# Patient Record
Sex: FEMALE | Race: WHITE | NOT HISPANIC OR LATINO | Employment: UNEMPLOYED | URBAN - METROPOLITAN AREA
[De-identification: names, ages, dates, MRNs, and addresses within clinical notes are randomized per-mention and may not be internally consistent; named-entity substitution may affect disease eponyms.]

---

## 2017-10-09 ENCOUNTER — TELEPHONE (OUTPATIENT)
Dept: ORTHOPEDIC SURGERY | Facility: CLINIC | Age: 52
End: 2017-10-09

## 2017-11-03 ENCOUNTER — OFFICE VISIT (OUTPATIENT)
Dept: ORTHOPEDIC SURGERY | Facility: CLINIC | Age: 52
End: 2017-11-03

## 2017-11-03 VITALS — HEIGHT: 65 IN | TEMPERATURE: 97.3 F | WEIGHT: 166.8 LBS | BODY MASS INDEX: 27.79 KG/M2

## 2017-11-03 DIAGNOSIS — G89.29 CHRONIC PAIN OF RIGHT KNEE: Primary | ICD-10-CM

## 2017-11-03 DIAGNOSIS — M05.761 RHEUMATOID ARTHRITIS INVOLVING RIGHT KNEE WITH POSITIVE RHEUMATOID FACTOR (HCC): ICD-10-CM

## 2017-11-03 DIAGNOSIS — M25.561 CHRONIC PAIN OF RIGHT KNEE: Primary | ICD-10-CM

## 2017-11-03 DIAGNOSIS — M17.11 ARTHRITIS OF RIGHT KNEE: ICD-10-CM

## 2017-11-03 PROCEDURE — 73562 X-RAY EXAM OF KNEE 3: CPT | Performed by: ORTHOPAEDIC SURGERY

## 2017-11-03 PROCEDURE — 99204 OFFICE O/P NEW MOD 45 MIN: CPT | Performed by: ORTHOPAEDIC SURGERY

## 2017-11-03 RX ORDER — CEPHALEXIN 500 MG/1
500 CAPSULE ORAL DAILY
Refills: 0 | Status: ON HOLD | COMMUNITY
Start: 2017-08-22 | End: 2018-01-17

## 2017-11-03 RX ORDER — HYDROXYCHLOROQUINE SULFATE 200 MG/1
200 TABLET, FILM COATED ORAL 2 TIMES DAILY
Refills: 0 | COMMUNITY
Start: 2017-10-02 | End: 2018-01-19 | Stop reason: HOSPADM

## 2017-11-03 RX ORDER — UBIDECARENONE 75 MG
50 CAPSULE ORAL 2 TIMES DAILY
COMMUNITY
End: 2018-01-19 | Stop reason: HOSPADM

## 2017-11-03 RX ORDER — VITAMIN E 268 MG
400 CAPSULE ORAL DAILY
COMMUNITY
End: 2018-01-19 | Stop reason: HOSPADM

## 2017-11-03 RX ORDER — FERROUS SULFATE 325(65) MG
325 TABLET ORAL AS NEEDED
COMMUNITY
End: 2018-01-19 | Stop reason: HOSPADM

## 2017-11-03 RX ORDER — FLUOXETINE HYDROCHLORIDE 20 MG/1
60 CAPSULE ORAL EVERY MORNING
Refills: 0 | COMMUNITY
Start: 2017-10-02 | End: 2018-03-02

## 2017-11-03 RX ORDER — GABAPENTIN 400 MG/1
CAPSULE ORAL
Refills: 0 | COMMUNITY
Start: 2017-10-03 | End: 2017-12-21

## 2017-11-03 RX ORDER — CYCLOBENZAPRINE HCL 10 MG
TABLET ORAL
Refills: 0 | COMMUNITY
Start: 2017-10-03 | End: 2017-12-21

## 2017-11-03 RX ORDER — LANOLIN ALCOHOL/MO/W.PET/CERES
400 CREAM (GRAM) TOPICAL DAILY
COMMUNITY
End: 2018-01-19 | Stop reason: HOSPADM

## 2017-11-03 RX ORDER — LOSARTAN POTASSIUM 100 MG/1
100 TABLET ORAL EVERY MORNING
Refills: 0 | COMMUNITY
Start: 2017-10-02

## 2017-11-03 RX ORDER — MELOXICAM 7.5 MG/1
7.5 TABLET ORAL DAILY
COMMUNITY
End: 2017-12-21

## 2017-11-03 NOTE — PROGRESS NOTES
Patient Name: Makayla Boo   YOB: 1965  Referring Primary Care Physician: Madhuri Ruiz MD      Chief Complaint:    Chief Complaint   Patient presents with   • Right Knee - Establish Care        Subjective:    HPI:   Makayla Boo is a pleasant 51 y.o. year old who presents today for evaluation of   Chief Complaint   Patient presents with   • Right Knee - Establish Care   .  51 with chronic hx of right knee pain and stiffness.  Had scopes in the past, injections, meds- none of which work anymore.  Mother was my patient.  She has RA and is followed by [facility].  Pain is constant, sharp/ache and doesn't radiate.    This problem is new to this examiner.     Medications:   Home Medications:  No current outpatient prescriptions on file prior to visit.     No current facility-administered medications on file prior to visit.      Current Medications:  Scheduled Meds:  Continuous Infusions:  No current facility-administered medications for this visit.   PRN Meds:.    I have reviewed the patient's medical history in detail and updated the computerized patient record.  Review and summarization of old records includes:    Past Medical History:   Diagnosis Date   • Depression    • Hypertension       History reviewed. No pertinent surgical history.     Social History     Occupational History   • Not on file.     Social History Main Topics   • Smoking status: Current Every Day Smoker     Packs/day: 0.50     Years: 30.00     Types: Cigarettes   • Smokeless tobacco: Never Used   • Alcohol use Yes      Comment: social   • Drug use: No   • Sexual activity: Not on file    Social History     Social History Narrative   • No narrative on file      History reviewed. No pertinent family history.    ROS: 14 point review of systems was performed and all other systems were reviewed and are negative except for documented findings in HPI and today's encounter.     Allergies: No Known Allergies  Constitutional:  Denies fever, shaking or  chills   Eyes:  Denies change in visual acuity   HENT:  Denies nasal congestion or sore throat   Respiratory:  Denies cough or shortness of breath   Cardiovascular:  Denies chest pain or severe LE edema   GI:  Denies abdominal pain, nausea, vomiting, bloody stools or diarrhea   Musculoskeletal:  Numbness, tingling, pain, or loss of motor function only as noted above in history of present illness.  : Denies painful urination or hematuria  Integument:  Denies rash, lesion or ulceration   Neurologic:  Denies headache or focal weakness  Endocrine:  Denies lymphadenopathy  Psych:  Denies confusion or change in mental status   Hem:  Denies active bleeding    Objective:    Physical Exam: 51 y.o. female  Body mass index is 27.76 kg/(m^2)., @WT@, @HT@  Vitals:    11/03/17 1010   Temp: 97.3 °F (36.3 °C)     Vital signs reviewed.   General Appearance:    Alert, cooperative, in no acute distress                  Eyes: conjunctiva clear  ENT: external ears and nose atraumatic  CV: no peripheral edema  Resp: normal respiratory effort  Skin: no rashes or wounds; normal turgor  Psych: mood and affect appropriate  Lymph: no nodes appreciated  Neuro: gross sensation intact  Vascular:  Palpable peripheral pulse in noted extremity  Musculoskeletal Extremities: KNEE Exam: medial joint line tenderness with crepitation, synovitis, swelling, and joint effusion right knee. 20 degree flexion contracture.  Stiff in flexion as well.    Radiology:   Imaging done today and discussed at length with the patient:    Indication: pain related symptoms,  Views: 3V AP, LAT & 40 degree PA right knee(s)   Findings: severe end-stage arthritis (bone on bone, subchondral sclerosis/cysts, osteophytes), consistent with RA  Comparison views: not available      Assessment:     ICD-10-CM ICD-9-CM   1. Chronic pain of right knee M25.561 719.46    G89.29 338.29   2. Arthritis of right knee M17.11 716.96   3. Rheumatoid arthritis involving right knee with  "positive rheumatoid factor M05.761 714.0        Procedures       Plan: Biomechanics of pertinent body area discussed.  Risks, benefits, alternatives, comparisons, and complications of accepted medicines, injections, recommendations, surgical procedures, and therapies explained and education provided in laymen's terms. The patient was given the opportunity to ask questions and they were answerved to their satisfaction.   Natural history and expected course of this patient's diagnosis discussed along with evaluation of therapies. Questions answered.  Cryotherapy/brachy therapy as indicated with instructions.   PT referral.  Reviewed medical records from other provider(s) for past and current medical history pertinent to this complaint.  >3.5 min spent counseling on health benefits of smoking cessation and risks associated with continued tobacco use.  TKA: Obtain medical clearance for surgery. Continuation of conservative management vs. TKA: I reviewed anatomy of a total knee arthroplasty in laymen's terms, as well as typical postoperative recovery and possibly 6-12 months for maximal recovery, and possible need for rehabilitation stay after hospitalization. We also discussed risks, benefits, alternatives, and limitations of procedure with risks including but not limited to neurovascular damage, bleeding, infection, malalignment, chronic pian, failure of implants, osteolysis, loosening of implants, loss of motion, weakness, stiffness, instability, DVT, pulmonary embolus, death, stroke, complex regional pain syndrome, myocardial infarction, and need for additional procedures. Concept of substitution vs. replacement discussed.  No guarantees were given regarding results of surgery.  Patient verbalized understanding, and was given the opportunity to ask and have all questions answered today.   Stop smoking.  Went over increased risk with RA and smoking \"even a little\"  She said she would.  Told her she is stiff and could " deja post op stiffness.    11/3/2017

## 2017-12-21 ENCOUNTER — APPOINTMENT (OUTPATIENT)
Dept: PREADMISSION TESTING | Facility: HOSPITAL | Age: 52
End: 2017-12-21

## 2017-12-21 VITALS
BODY MASS INDEX: 26.84 KG/M2 | HEART RATE: 61 BPM | SYSTOLIC BLOOD PRESSURE: 161 MMHG | HEIGHT: 66 IN | RESPIRATION RATE: 20 BRPM | OXYGEN SATURATION: 97 % | WEIGHT: 167 LBS | DIASTOLIC BLOOD PRESSURE: 86 MMHG | TEMPERATURE: 97.1 F

## 2017-12-21 DIAGNOSIS — M17.11 ARTHRITIS OF RIGHT KNEE: ICD-10-CM

## 2017-12-21 LAB
ANION GAP SERPL CALCULATED.3IONS-SCNC: 11.8 MMOL/L
BILIRUB UR QL STRIP: NEGATIVE
BUN BLD-MCNC: 14 MG/DL (ref 6–20)
BUN/CREAT SERPL: 17.1 (ref 7–25)
CALCIUM SPEC-SCNC: 9.7 MG/DL (ref 8.6–10.5)
CHLORIDE SERPL-SCNC: 101 MMOL/L (ref 98–107)
CLARITY UR: CLEAR
CO2 SERPL-SCNC: 26.2 MMOL/L (ref 22–29)
COLOR UR: YELLOW
CREAT BLD-MCNC: 0.82 MG/DL (ref 0.57–1)
DEPRECATED RDW RBC AUTO: 42.9 FL (ref 37–54)
ERYTHROCYTE [DISTWIDTH] IN BLOOD BY AUTOMATED COUNT: 12.8 % (ref 11.7–13)
GFR SERPL CREATININE-BSD FRML MDRD: 74 ML/MIN/1.73
GLUCOSE BLD-MCNC: 108 MG/DL (ref 65–99)
GLUCOSE UR STRIP-MCNC: NEGATIVE MG/DL
HCT VFR BLD AUTO: 42.7 % (ref 35.6–45.5)
HGB BLD-MCNC: 14.2 G/DL (ref 11.9–15.5)
HGB UR QL STRIP.AUTO: NEGATIVE
KETONES UR QL STRIP: NEGATIVE
LEUKOCYTE ESTERASE UR QL STRIP.AUTO: NEGATIVE
MCH RBC QN AUTO: 30.6 PG (ref 26.9–32)
MCHC RBC AUTO-ENTMCNC: 33.3 G/DL (ref 32.4–36.3)
MCV RBC AUTO: 92 FL (ref 80.5–98.2)
NITRITE UR QL STRIP: NEGATIVE
PH UR STRIP.AUTO: 6 [PH] (ref 5–8)
PLATELET # BLD AUTO: 248 10*3/MM3 (ref 140–500)
PMV BLD AUTO: 10.6 FL (ref 6–12)
POTASSIUM BLD-SCNC: 4.4 MMOL/L (ref 3.5–5.2)
PROT UR QL STRIP: NEGATIVE
RBC # BLD AUTO: 4.64 10*6/MM3 (ref 3.9–5.2)
SODIUM BLD-SCNC: 139 MMOL/L (ref 136–145)
SP GR UR STRIP: 1.02 (ref 1–1.03)
UROBILINOGEN UR QL STRIP: NORMAL
WBC NRBC COR # BLD: 7.55 10*3/MM3 (ref 4.5–10.7)

## 2017-12-21 PROCEDURE — 93010 ELECTROCARDIOGRAM REPORT: CPT | Performed by: INTERNAL MEDICINE

## 2017-12-21 PROCEDURE — 80048 BASIC METABOLIC PNL TOTAL CA: CPT | Performed by: ORTHOPAEDIC SURGERY

## 2017-12-21 PROCEDURE — 93005 ELECTROCARDIOGRAM TRACING: CPT

## 2017-12-21 PROCEDURE — 81003 URINALYSIS AUTO W/O SCOPE: CPT | Performed by: ORTHOPAEDIC SURGERY

## 2017-12-21 PROCEDURE — 85027 COMPLETE CBC AUTOMATED: CPT | Performed by: ORTHOPAEDIC SURGERY

## 2017-12-21 PROCEDURE — 36415 COLL VENOUS BLD VENIPUNCTURE: CPT

## 2017-12-21 RX ORDER — GABAPENTIN 400 MG/1
400 CAPSULE ORAL 3 TIMES DAILY PRN
COMMUNITY

## 2017-12-21 RX ORDER — CHLORHEXIDINE GLUCONATE 500 MG/1
1 CLOTH TOPICAL TAKE AS DIRECTED
COMMUNITY
End: 2018-01-19 | Stop reason: HOSPADM

## 2017-12-21 RX ORDER — CYCLOBENZAPRINE HCL 10 MG
10 TABLET ORAL 3 TIMES DAILY PRN
COMMUNITY

## 2017-12-21 ASSESSMENT — KOOS JR
KOOS JR SCORE: 16
KOOS JR SCORE: 47.487

## 2017-12-21 NOTE — DISCHARGE INSTRUCTIONS
Take the following medications the morning of surgery with a small sip of water:    LOSARTAN AND FLUOXETINE    ARRIVE AT 9:00    General Instructions:  • Do not eat solid food after midnight the night before surgery.  • You may drink clear liquids day of surgery but must stop at least one hour before your hospital arrival time.  • It is beneficial for you to have a clear drink that contains carbohydrates the day of surgery.  We suggest a 12 to 20 ounce bottle of Gatorade or Powerade for non-diabetic patients or a 12 to 20 ounce bottle of G2 or Powerade Zero for diabetic patients. (Pediatric patients, are not advised to drink a 12 to 20 ounce carbohydrate drink)    Clear liquids are liquids you can see through.  Nothing red in color.     Plain water                               Sports drinks  Sodas                                   Gelatin (Jell-O)  Fruit juices without pulp such as white grape juice and apple juice  Popsicles that contain no fruit or yogurt  Tea or coffee (no cream or milk added)  Gatorade / Powerade  G2 / Powerade Zero    • Infants may have breast milk up to four hours before surgery.  • Infants drinking formula may drink formula up to six hours before surgery.   • Patients who avoid smoking, chewing tobacco and alcohol for 4 weeks prior to surgery have a reduced risk of post-operative complications.  Quit smoking as many days before surgery as you can.  • Do not smoke, use chewing tobacco or drink alcohol the day of surgery.   • If applicable bring your C-PAP/ BI-PAP machine.  • Bring any papers given to you in the doctor’s office.  • Wear clean comfortable clothes and socks.  • Do not wear contact lenses or make-up.  Bring a case for your glasses.   • Bring crutches or walker if applicable.  • Remove all piercings.  Leave jewelry and any other valuables at home.  • The Pre-Admission Testing nurse will instruct you to bring medications if unable to obtain an accurate list in Pre-Admission  Testing.        If you were given a blood bank ID arm band remember to bring it with you the day of surgery.    Preventing a Surgical Site Infection:  • For 2 to 3 days before surgery, avoid shaving with a razor because the razor can irritate skin and make it easier to develop an infection.  • The night prior to surgery sleep in a clean bed with clean clothing.  Do not allow pets to sleep with you.  • Shower on the morning of surgery using a fresh bar of anti-bacterial soap (such as Dial) and clean washcloth.  Dry with a clean towel and dress in clean clothing.  • Ask your surgeon if you will be receiving antibiotics prior to surgery.  • Make sure you, your family, and all healthcare providers clean their hands with soap and water or an alcohol based hand  before caring for you or your wound.    Day of surgery:  Upon arrival, a Pre-op nurse and Anesthesiologist will review your health history, obtain vital signs, and answer questions you may have.  The only belongings needed at this time will be your home medications and if applicable your C-PAP/BI-PAP machine.  If you are staying overnight your family can leave the rest of your belongings in the car and bring them to your room later.  A Pre-op nurse will start an IV and you may receive medication in preparation for surgery, including something to help you relax.  Your family will be able to see you in the Pre-op area.  While you are in surgery your family should notify the waiting room  if they leave the waiting room area and provide a contact phone number.    Please be aware that surgery does come with discomfort.  We want to make every effort to control your discomfort so please discuss any uncontrolled symptoms with your nurse.   Your doctor will most likely have prescribed pain medications.      If you are going home after surgery you will receive individualized written care instructions before being discharged.  A responsible adult must  drive you to and from the hospital on the day of your surgery and stay with you for 24 hours.    If you are staying overnight following surgery, you will be transported to your hospital room following the recovery period.  [facility] has all private rooms.    If you have any questions please call Pre-Admission Testing at [phone].  Deductibles and co-payments are collected on the day of service. Please be prepared to pay the required co-pay, deductible or deposit on the day of service as defined by your plan.    2% CHLORAHEXIDINE GLUCONATE* CLOTH  Preparing or “prepping” skin before surgery can reduce the risk of infection at the surgical site. To make the process easier, [facility] has chosen disposable cloths moistened with a rinse-free, 2% Chlorhexidine Gluconate (CHG) antiseptic solution. The steps below outline the prepping process and should be carefully followed.        Use the prep cloth on the area that is circled in the diagram             Directions Night before Surgery  1) Shower using a fresh bar of anti-bacterial soap (such as Dial) and clean washcloth.  Use a clean towel to completely dry your skin.  2) Do not use any lotions, oils or creams on your skin.  3) Open the package and remove 1 cloth, wipe your skin for 30 seconds in a circular motion.  Allow to dry for 3 minutes.  4) Repeat #3 with second cloth.  5) Do not touch your eyes, ears, or mouth with the prep cloth.  6) Allow the wet prep solution to air dry.  7) Discard the prep cloth and wash your hands with soap and water.   8) Dress in clean bed clothes and sleep on fresh clean bed sheets.   9) You may experience some temporary itching after the prep.    Directions Day of Surgery  1) Repeat steps 1,2,3,4,5,6,7, and 9.   2) Dress in clean clothes before coming to the hospital.    BACTROBAN NASAL OINTMENT  There are many germs normally in your nose. Bactroban is an ointment that will help reduce these germs. Please  follow these instructions for Bactroban use:    ____Two days before surgery in the evening Date________    ____The day before surgery in the morning  Date________    ____The day before surgery in the evening              Date________    ____The day of surgery in the morning    Date________    **Squirt ½ package of Bactroban Ointment onto a cotton applicator and apply to inside of 1st nostril.  Squirt the remaining Bactroban and apply to the inside of the other nostril.    PERIDEX- ORAL:  Use only if your surgeon has ordered  Use the night before and morning of surgery - Swish, gargle, and spit - do not swallow.

## 2018-01-03 NOTE — PROGRESS NOTES
Pre-Operative Orthopedic Assessment      Patient: Makayla Boo    YOB: 1965      Age/Gender: 52 y.o. female  Medical Record Number: [id number]  Surgical Procedure Planned: RIGHT TOTAL KNEE ARTHROPLASTY WITH HERBERT NAVIGATION     Surgeon: Arthur Higgins MD    Date of Surgery Planned: 01/17/2018    Question Value Score    Patient Score   1. What is your age group? 50-65 years  66-75 years  >75 years =2  =1  =0 2   2. Gender? Male  Female =2  =1 1   3. How far on average can you walk? (a block is 200 meters) Two blocks or more (+\- rest)  1-2 blocks (+\- rest)  Housebound (most of time) =2  =1  =0 1   4. Which gait aid to you use? (more often than not) None  Single-Point Stick  Crutches/Frame =2  =1  =0 2   5. Do you use community  supports? (home help, meals on wheels, district nursing) None or one per week  Two or more per week =1  =0 1   6. Will you live with someone who can care for you after your operation? Yes  No =3  =0 3      Your Score (out of 12)  10     Key Destination at Discharge from acute care predicted by score   Scores < 6  -- extended inpatient rehabilitation   Scores 6-9 -- additional intervention to discharge directly home (Rehabilitation in home)   Scores > 9 -- directly home         Discharge Disposition/Planning:     Patient Response   Discussed the Predicted discharge disposition needed based on RAPT Assessment with the patient.    yes   Patient selected discharge disposition:   home   Out Patient Rehabilitation Facility of Choice:    No recent therapy   Home Health Services Preferred:   [facility] and another agency in the past.  She will provide agency of choice once admitted.   Has the patient completed or been scheduled to complete pre-operative testing? yes   Post-Operative Care Giver Name and Phone Number:    Mother Annalisa [phone]     Subacute Inpatient Rehabilitation:  Complete this section only if planning inpatient services at a Subacute  Facility     Patient Response   Subacute Facility Preferred (Please list 2 facilities:      Requires pre-certification for inpatient rehabilitation services?         Planned source of transportation to inpatient rehabilitation facility?       If choosing inpatient services at an Acute or Subacute Facility please list a subsequent back-up plan (in case patient fails to qualify for inpatient rehabilitation). Back-up plans should include caregiver (family member or friend) for first 24-48 post- -operatively.       Home Equipment Patient Response   Does patient have a walker for home use?    Yes (mothers)   Does patient have a 3 in 1 commode for home use?    no   Does patient have a shower chair for home use?    no   Does patient have an elevated commode seat for home use? no   Does patient have a cane for home use?    Patient thinks her mother may have one   Is there any other medical equipment in the home? If so,  List in comment section below crutches   Pre-Operative Class Attendance Patient Response   Attended or scheduled to attend the pre-operative class within 1 year of total joint replacement? yes   Not planning to attend the pre-operative class (see comments below)    Attended in Dec   Patient Education  Completed   Expected time of discharge discussed yes   Encouraged to attend Pre-Operative Class    attended   Education re: Back-up plan for patients planning discharge to subacute facilities n/a   Education re: Predicted Discharge Disposition based on RAPT score    yes   Patient receptive and voiced understanding of information given    yes                                                                                                            Comments:    Address verified.  Patient has 3 steps to enter the home with handrails on both sides of steps.  No other steps needed once inside the home.  Patient resides with her sister but her mother will come to stay with her at dc and while her sister is at work.   Plan is home with .                                       Signature: Francisca Raman RN    Date:  1/3/2018

## 2018-01-11 ENCOUNTER — OFFICE VISIT (OUTPATIENT)
Dept: ORTHOPEDIC SURGERY | Facility: CLINIC | Age: 53
End: 2018-01-11

## 2018-01-11 VITALS — BODY MASS INDEX: 26.84 KG/M2 | HEIGHT: 66 IN | WEIGHT: 167 LBS | TEMPERATURE: 97.8 F

## 2018-01-11 DIAGNOSIS — M17.11 ARTHRITIS OF RIGHT KNEE: Primary | ICD-10-CM

## 2018-01-11 DIAGNOSIS — M05.761 RHEUMATOID ARTHRITIS INVOLVING RIGHT KNEE WITH POSITIVE RHEUMATOID FACTOR (HCC): ICD-10-CM

## 2018-01-11 PROCEDURE — S0260 H&P FOR SURGERY: HCPCS | Performed by: NURSE PRACTITIONER

## 2018-01-11 NOTE — PROGRESS NOTES
History & Physical       Patient: Makayla Boo  YOB: 1965  Medical Record Number: [id number]  Body mass index is 27.37 kg/(m^2).    Surgeon:  Dr. Arthur Higgins    Chief Complaints:   Chief Complaint   Patient presents with   • Right Knee - Pre-op Exam, Pain       Subjective:    History of Present Illness: 52 y.o. female presents with   Chief Complaint   Patient presents with   • Right Knee - Pre-op Exam, Pain   . Onset of symptoms was years ago and has been progressively worsening despite more conservative treatment measures.  Symptoms are associated with ability to move, exercise, and perform activities of daily living.  Symptoms are aggravated by weight bearing and ROM necessary for activities of daily living.   Symptoms improve with rest, ice and elevation only minimally.      Allergies:   Allergies   Allergen Reactions   • Codeine Nausea And Vomiting   • Adhesive Tape Rash       Medications:   Home Medications:  Current Outpatient Prescriptions on File Prior to Visit   Medication Sig   • cephalexin (KEFLEX) 500 MG capsule Take 500 mg by mouth Daily.   • Chlorhexidine Gluconate Cloth 2 % pads Apply  topically. As directed pre op   • cyclobenzaprine (FLEXERIL) 10 MG tablet Take 10 mg by mouth 3 (Three) Times a Day As Needed for Muscle Spasms.   • ferrous sulfate 325 (65 FE) MG tablet Take 325 mg by mouth As Needed.   • FLUoxetine (PROzac) 20 MG capsule Take 60 mg by mouth Every Morning.   • folic acid (FOLVITE) 400 MCG tablet Take 400 mcg by mouth Daily.   • gabapentin (NEURONTIN) 400 MG capsule Take 400 mg by mouth 3 (Three) Times a Day As Needed.   • hydroxychloroquine (PLAQUENIL) 200 MG tablet Take 200 mg by mouth 2 (Two) Times a Day.   • losartan (COZAAR) 100 MG tablet Take 100 mg by mouth Every Morning.   • mupirocin (BACTROBAN) 2 % nasal ointment into each nostril. As directed pre op   • vitamin B-12 (CYANOCOBALAMIN) 100 MCG tablet Take 50 mcg by mouth 2 (Two) Times a Day.   • vitamin E 400  UNIT capsule Take 400 Units by mouth Daily. TO HOLD 1 WEEK BEFORE SURGERY     No current facility-administered medications on file prior to visit.      Current Medications:  Scheduled Meds:  Continuous Infusions:  No current facility-administered medications for this visit.   PRN Meds:.    I have reviewed the patient's medical history in detail and updated the computerized patient record.  Review and summarization of old records include:    Past Medical History:   Diagnosis Date   • Anxiety    • Arthritis     RA   • COPD (chronic obstructive pulmonary disease)    • Depression    • Heart murmur    • Hepatitis C carrier    • History of sepsis     RT HAND/WIRST 2016   • History of transfusion    • Hypertension    • Knee pain         Past Surgical History:   Procedure Laterality Date   • BUNIONECTOMY Bilateral    • CARPAL TUNNEL RELEASE Bilateral    • CERVICAL FUSION     • INCISION AND DRAINAGE ABSCESS      RT HAND/WRIST X1   • KNEE ARTHROSCOPY     • KNEE ARTHROSCOPY W/ ACL RECONSTRUCTION Right    • ORIF FINGER / THUMB FRACTURE Right         Social History     Occupational History   • Not on file.     Social History Main Topics   • Smoking status: Current Every Day Smoker     Packs/day: 0.50     Years: 30.00     Types: Cigarettes   • Smokeless tobacco: Never Used   • Alcohol use Yes      Comment: social   • Drug use: Yes     Special: Marijuana      Comment: DAILY   • Sexual activity: Defer    Social History     Social History Narrative        Family History   Problem Relation Age of Onset   • Malig Hyperthermia Neg Hx        ROS: 14 point review of systems was performed and was negative except for documented findings in HPI and today's encounter.     Allergies:   Allergies   Allergen Reactions   • Codeine Nausea And Vomiting   • Adhesive Tape Rash     Constitutional:  Denies fever, shaking or chills   Eyes:  Denies change in visual acuity   HENT:  Denies nasal congestion or sore throat   Respiratory:  Denies cough or  shortness of breath   Cardiovascular:  Denies chest pain or severe LE edema   GI:  Denies abdominal pain, nausea, vomiting, bloody stools or diarrhea   Musculoskeletal:  Denies numbness tingling or loss of motor function except as outlined above in history of present illness.  : Denies painful urination or hematuria  Integument:  Denies rash, lesion or ulceration   Neurologic:  Denies headache or focal weakness  Endocrine:  Denies lymphadenopathy  Psych:  Denies confusion or change in mental status   Hem:  Denies active bleeding    Physical Exam: 52 y.o. female  Body mass index is 27.37 kg/(m^2)., @HT@, @WT@  Vitals:    01/11/18 0934   Temp: 97.8 °F (36.6 °C)     Vital signs reviewed.   General Appearance:    Alert, cooperative, in no acute distress                  Eyes: conjunctiva clear  ENT: external ears and nose atraumatic  CV: no peripheral edema  Resp: normal respiratory effort  Skin: no rashes or wounds; normal turgor  Psych: mood and affect appropriate  Lymph: no nodes appreciated  Neuro: gross sensation intact  Vascular:  Palpable peripheral pulse in noted extremity  Musculoskeletal Extremities: DETAILED KNEE Exam: Right knee: Painful gait w/wo limp, muscle atrophy, erythema, ecchymosis, or gross deformity noted, Large knee effusion, + lateral joint line tenderness, Active range of motion flexion contracture and approx 5 degrees, 5/5 strength flexion and extension, The knee is stable to varus and valgus stress testing, VARUS VALGUS NEUTRAL: valgus alignment of the limb, Lachman negative, Posterior drawer negative, Ryan's negative, Patellofemoral grind +, Sensation grossly intact to light tough throughout the lower extremity, Skin is intact, Distal pulses are palpable, No signs or symptoms of DVT          Diagnostic Tests:  No visits with results within 2 Week(s) from this visit.  Latest known visit with results is:    Appointment on 12/21/2017   Component Date Value Ref Range Status   • Glucose  12/21/2017 108* 65 - 99 mg/dL Final   • BUN 12/21/2017 14  6 - 20 mg/dL Final   • Creatinine 12/21/2017 0.82  0.57 - 1.00 mg/dL Final   • Sodium 12/21/2017 139  136 - 145 mmol/L Final   • Potassium 12/21/2017 4.4  3.5 - 5.2 mmol/L Final   • Chloride 12/21/2017 101  98 - 107 mmol/L Final   • CO2 12/21/2017 26.2  22.0 - 29.0 mmol/L Final   • Calcium 12/21/2017 9.7  8.6 - 10.5 mg/dL Final   • eGFR Non  Amer 12/21/2017 74  >60 mL/min/1.73 Final   • BUN/Creatinine Ratio 12/21/2017 17.1  7.0 - 25.0 Final   • Anion Gap 12/21/2017 11.8  mmol/L Final   • WBC 12/21/2017 7.55  4.50 - 10.70 10*3/mm3 Final   • RBC 12/21/2017 4.64  3.90 - 5.20 10*6/mm3 Final   • Hemoglobin 12/21/2017 14.2  11.9 - 15.5 g/dL Final   • Hematocrit 12/21/2017 42.7  35.6 - 45.5 % Final   • MCV 12/21/2017 92.0  80.5 - 98.2 fL Final   • MCH 12/21/2017 30.6  26.9 - 32.0 pg Final   • MCHC 12/21/2017 33.3  32.4 - 36.3 g/dL Final   • RDW 12/21/2017 12.8  11.7 - 13.0 % Final   • RDW-SD 12/21/2017 42.9  37.0 - 54.0 fl Final   • MPV 12/21/2017 10.6  6.0 - 12.0 fL Final   • Platelets 12/21/2017 248  140 - 500 10*3/mm3 Final   • Color, UA 12/21/2017 Yellow  Yellow, Straw Final   • Appearance, UA 12/21/2017 Clear  Clear Final   • pH, UA 12/21/2017 6.0  5.0 - 8.0 Final   • Specific Gravity, UA 12/21/2017 1.019  1.005 - 1.030 Final   • Glucose, UA 12/21/2017 Negative  Negative Final   • Ketones, UA 12/21/2017 Negative  Negative Final   • Bilirubin, UA 12/21/2017 Negative  Negative Final   • Blood, UA 12/21/2017 Negative  Negative Final   • Protein, UA 12/21/2017 Negative  Negative Final   • Leuk Esterase, UA 12/21/2017 Negative  Negative Final   • Nitrite, UA 12/21/2017 Negative  Negative Final   • Urobilinogen, UA 12/21/2017 0.2 E.U./dL  0.2 - 1.0 E.U./dL Final     No results found.    Imaging was done previously in the office, images were personally viewed and discussed at length with the patient:    Indication: pain related symptoms,  Views: 3V AP, LAT &  40 degree PA right knee(s)   Findings: severe end-stage arthritis (bone on bone, subchondral sclerosis/cysts, osteophytes)  Comparison views: viewed last xray done in the office.     Assessment:  Patient Active Problem List   Diagnosis   • Chronic pain of right knee   • Arthritis of right knee   • Rheumatoid arthritis involving right knee with positive rheumatoid factor       Plan:  Dr. Arthur Higgins reviewed anatomy of a total joint arthroplasty in laymen's terms, as well as typical postoperative recovery and possibly 6-12 months for maximal recovery, and possible need for rehabilitation stay after hospitalization. We also discussed risks, benefits, alternatives, and limitations of procedure with risks including but not limited to neurovascular damage, bleeding, infection, malalignment, chronic pian, failure of implants, osteolysis, loosening of implants, loss of motion, weakness, stiffness, instability, DVT, pulmonary embolus, death, stroke, complex regional pain syndrome, myocardial infarction, and need for additional procedures. Concept of substitution vs. replacement discussed.  No guarantees were given regarding results of surgery.      Makayla Boo was given the opportunity to ask and have all questions answered today.  The patient voiced understanding of the risks, benefits, and alternative forms of treatment that were discussed and the patient consents to proceed with surgery.     Patient's blood clot history is positive years ago was told that she had a blood clot in her calf but is not normally on blood thinner of any kind and isnt sure she really had one.   Planned DVT prophylaxis for surgery:  Oly James at [facility] infectious disease cleared for surgery after MRSA infection of right wrist that is now healed and completely clear.     Discharge Plan: POD 2-3 to home and home health    Patient was seen by ESTELA Vu in the office today.    Date: 1/11/2018  ESTELA Hernandez

## 2018-01-17 ENCOUNTER — ANESTHESIA EVENT (OUTPATIENT)
Dept: PERIOP | Facility: HOSPITAL | Age: 53
End: 2018-01-17

## 2018-01-17 ENCOUNTER — ANESTHESIA (OUTPATIENT)
Dept: PERIOP | Facility: HOSPITAL | Age: 53
End: 2018-01-17

## 2018-01-17 ENCOUNTER — HOSPITAL ENCOUNTER (INPATIENT)
Facility: HOSPITAL | Age: 53
LOS: 2 days | Discharge: HOME HEALTH | End: 2018-01-19
Attending: ORTHOPAEDIC SURGERY | Admitting: ORTHOPAEDIC SURGERY

## 2018-01-17 ENCOUNTER — APPOINTMENT (OUTPATIENT)
Dept: GENERAL RADIOLOGY | Facility: HOSPITAL | Age: 53
End: 2018-01-17

## 2018-01-17 DIAGNOSIS — M17.11 ARTHRITIS OF RIGHT KNEE: ICD-10-CM

## 2018-01-17 DIAGNOSIS — Z96.651 STATUS POST TOTAL RIGHT KNEE REPLACEMENT: Primary | ICD-10-CM

## 2018-01-17 PROCEDURE — 25010000002 PROPOFOL 10 MG/ML EMULSION: Performed by: ANESTHESIOLOGY

## 2018-01-17 PROCEDURE — 25010000002 MORPHINE (PF) 10 MG/ML SOLUTION 1 ML VIAL: Performed by: ORTHOPAEDIC SURGERY

## 2018-01-17 PROCEDURE — C1776 JOINT DEVICE (IMPLANTABLE): HCPCS | Performed by: ORTHOPAEDIC SURGERY

## 2018-01-17 PROCEDURE — 25010000002 HYDROMORPHONE PER 4 MG: Performed by: ANESTHESIOLOGY

## 2018-01-17 PROCEDURE — 0SRC0J9 REPLACEMENT OF RIGHT KNEE JOINT WITH SYNTHETIC SUBSTITUTE, CEMENTED, OPEN APPROACH: ICD-10-PCS | Performed by: ORTHOPAEDIC SURGERY

## 2018-01-17 PROCEDURE — C1713 ANCHOR/SCREW BN/BN,TIS/BN: HCPCS | Performed by: ORTHOPAEDIC SURGERY

## 2018-01-17 PROCEDURE — 25010000002 KETOROLAC TROMETHAMINE PER 15 MG: Performed by: ORTHOPAEDIC SURGERY

## 2018-01-17 PROCEDURE — 25010000002 HYDRALAZINE PER 20 MG: Performed by: ANESTHESIOLOGY

## 2018-01-17 PROCEDURE — 25010000002 ONDANSETRON PER 1 MG: Performed by: ANESTHESIOLOGY

## 2018-01-17 PROCEDURE — 8E0YXBZ COMPUTER ASSISTED PROCEDURE OF LOWER EXTREMITY: ICD-10-PCS | Performed by: ORTHOPAEDIC SURGERY

## 2018-01-17 PROCEDURE — 25010000003 CEFAZOLIN IN DEXTROSE 2-4 GM/100ML-% SOLUTION: Performed by: ANESTHESIOLOGY

## 2018-01-17 PROCEDURE — 25010000002 FENTANYL CITRATE (PF) 250 MCG/5ML SOLUTION

## 2018-01-17 PROCEDURE — 20985 CPTR-ASST DIR MS PX: CPT | Performed by: ORTHOPAEDIC SURGERY

## 2018-01-17 PROCEDURE — 25010000002 HYDROMORPHONE PER 4 MG: Performed by: ORTHOPAEDIC SURGERY

## 2018-01-17 PROCEDURE — 25010000002 EPINEPHRINE PER 0.1 MG: Performed by: ORTHOPAEDIC SURGERY

## 2018-01-17 PROCEDURE — 25010000002 MIDAZOLAM PER 1 MG: Performed by: ANESTHESIOLOGY

## 2018-01-17 PROCEDURE — 27447 TOTAL KNEE ARTHROPLASTY: CPT | Performed by: NURSE PRACTITIONER

## 2018-01-17 PROCEDURE — 25010000002 ROPIVACAINE PER 1 MG: Performed by: ORTHOPAEDIC SURGERY

## 2018-01-17 PROCEDURE — 27447 TOTAL KNEE ARTHROPLASTY: CPT | Performed by: ORTHOPAEDIC SURGERY

## 2018-01-17 PROCEDURE — 25010000002 NEOSTIGMINE PER 0.5 MG: Performed by: ANESTHESIOLOGY

## 2018-01-17 PROCEDURE — 25010000002 FENTANYL CITRATE (PF) 100 MCG/2ML SOLUTION: Performed by: ANESTHESIOLOGY

## 2018-01-17 PROCEDURE — 25010000002 DEXAMETHASONE PER 1 MG: Performed by: ANESTHESIOLOGY

## 2018-01-17 PROCEDURE — 73560 X-RAY EXAM OF KNEE 1 OR 2: CPT

## 2018-01-17 DEVICE — P.F.C. SIGMA TIBIAL TRAY FIXED BEARING MODULAR COCR 3 CEMENTED
Type: IMPLANTABLE DEVICE | Site: KNEE | Status: FUNCTIONAL
Brand: P.F.C. SIGMA

## 2018-01-17 DEVICE — IMPLANTABLE DEVICE: Type: IMPLANTABLE DEVICE | Site: KNEE | Status: FUNCTIONAL

## 2018-01-17 DEVICE — SMARTSET GMV HIGH PERFORMANCE GENTAMICIN MEDIUM VISCOSITY BONE CEMENT 40G
Type: IMPLANTABLE DEVICE | Site: KNEE | Status: FUNCTIONAL
Brand: SMARTSET

## 2018-01-17 DEVICE — SIGMA TIBIAL INSERT FIXED BEARING CURVED PLUS 3 8MM XLK
Type: IMPLANTABLE DEVICE | Site: KNEE | Status: FUNCTIONAL
Brand: SIGMA

## 2018-01-17 DEVICE — SIGMA FEMORAL CRUCIATE RETAINING CEMENTED 3 RIGHT
Type: IMPLANTABLE DEVICE | Site: KNEE | Status: FUNCTIONAL
Brand: SIGMA

## 2018-01-17 DEVICE — P.F.C. SIGMA OVAL DOME PATELLA 3-PEG 38MM CEMENTED
Type: IMPLANTABLE DEVICE | Site: KNEE | Status: FUNCTIONAL
Brand: P.F.C. SIGMA

## 2018-01-17 RX ORDER — HYDRALAZINE HYDROCHLORIDE 20 MG/ML
INJECTION INTRAMUSCULAR; INTRAVENOUS AS NEEDED
Status: DISCONTINUED | OUTPATIENT
Start: 2018-01-17 | End: 2018-01-17 | Stop reason: SURG

## 2018-01-17 RX ORDER — FAMOTIDINE 10 MG/ML
20 INJECTION, SOLUTION INTRAVENOUS ONCE
Status: COMPLETED | OUTPATIENT
Start: 2018-01-17 | End: 2018-01-17

## 2018-01-17 RX ORDER — PROMETHAZINE HYDROCHLORIDE 25 MG/1
25 SUPPOSITORY RECTAL ONCE AS NEEDED
Status: DISCONTINUED | OUTPATIENT
Start: 2018-01-17 | End: 2018-01-17 | Stop reason: HOSPADM

## 2018-01-17 RX ORDER — ONDANSETRON 2 MG/ML
INJECTION INTRAMUSCULAR; INTRAVENOUS AS NEEDED
Status: DISCONTINUED | OUTPATIENT
Start: 2018-01-17 | End: 2018-01-17 | Stop reason: SURG

## 2018-01-17 RX ORDER — PROPOFOL 10 MG/ML
VIAL (ML) INTRAVENOUS AS NEEDED
Status: DISCONTINUED | OUTPATIENT
Start: 2018-01-17 | End: 2018-01-17 | Stop reason: SURG

## 2018-01-17 RX ORDER — LABETALOL HYDROCHLORIDE 5 MG/ML
5 INJECTION, SOLUTION INTRAVENOUS
Status: DISCONTINUED | OUTPATIENT
Start: 2018-01-17 | End: 2018-01-17 | Stop reason: HOSPADM

## 2018-01-17 RX ORDER — ONDANSETRON 2 MG/ML
4 INJECTION INTRAMUSCULAR; INTRAVENOUS EVERY 6 HOURS PRN
Status: DISCONTINUED | OUTPATIENT
Start: 2018-01-17 | End: 2018-01-19 | Stop reason: HOSPADM

## 2018-01-17 RX ORDER — MIDAZOLAM HYDROCHLORIDE 1 MG/ML
2 INJECTION INTRAMUSCULAR; INTRAVENOUS
Status: DISCONTINUED | OUTPATIENT
Start: 2018-01-17 | End: 2018-01-17 | Stop reason: HOSPADM

## 2018-01-17 RX ORDER — FENTANYL CITRATE 50 UG/ML
INJECTION, SOLUTION INTRAMUSCULAR; INTRAVENOUS
Status: COMPLETED
Start: 2018-01-17 | End: 2018-01-17

## 2018-01-17 RX ORDER — FENTANYL CITRATE 50 UG/ML
50 INJECTION, SOLUTION INTRAMUSCULAR; INTRAVENOUS
Status: DISCONTINUED | OUTPATIENT
Start: 2018-01-17 | End: 2018-01-17 | Stop reason: HOSPADM

## 2018-01-17 RX ORDER — PROMETHAZINE HYDROCHLORIDE 25 MG/1
12.5 TABLET ORAL ONCE AS NEEDED
Status: DISCONTINUED | OUTPATIENT
Start: 2018-01-17 | End: 2018-01-17 | Stop reason: HOSPADM

## 2018-01-17 RX ORDER — DOCUSATE SODIUM 100 MG/1
100 CAPSULE, LIQUID FILLED ORAL 2 TIMES DAILY
Status: DISCONTINUED | OUTPATIENT
Start: 2018-01-17 | End: 2018-01-19 | Stop reason: HOSPADM

## 2018-01-17 RX ORDER — DIPHENHYDRAMINE HYDROCHLORIDE 50 MG/ML
12.5 INJECTION INTRAMUSCULAR; INTRAVENOUS
Status: DISCONTINUED | OUTPATIENT
Start: 2018-01-17 | End: 2018-01-17 | Stop reason: HOSPADM

## 2018-01-17 RX ORDER — EPHEDRINE SULFATE 50 MG/ML
INJECTION, SOLUTION INTRAVENOUS AS NEEDED
Status: DISCONTINUED | OUTPATIENT
Start: 2018-01-17 | End: 2018-01-17 | Stop reason: SURG

## 2018-01-17 RX ORDER — OXYCODONE AND ACETAMINOPHEN 7.5; 325 MG/1; MG/1
1 TABLET ORAL
Status: DISCONTINUED | OUTPATIENT
Start: 2018-01-17 | End: 2018-01-19 | Stop reason: HOSPADM

## 2018-01-17 RX ORDER — OXYCODONE AND ACETAMINOPHEN 7.5; 325 MG/1; MG/1
2 TABLET ORAL
Status: DISCONTINUED | OUTPATIENT
Start: 2018-01-17 | End: 2018-01-19 | Stop reason: HOSPADM

## 2018-01-17 RX ORDER — LIDOCAINE HYDROCHLORIDE 20 MG/ML
INJECTION, SOLUTION INFILTRATION; PERINEURAL AS NEEDED
Status: DISCONTINUED | OUTPATIENT
Start: 2018-01-17 | End: 2018-01-17 | Stop reason: SURG

## 2018-01-17 RX ORDER — HYDROCODONE BITARTRATE AND ACETAMINOPHEN 7.5; 325 MG/1; MG/1
1 TABLET ORAL ONCE AS NEEDED
Status: DISCONTINUED | OUTPATIENT
Start: 2018-01-17 | End: 2018-01-17 | Stop reason: HOSPADM

## 2018-01-17 RX ORDER — FLUOXETINE HYDROCHLORIDE 20 MG/1
60 CAPSULE ORAL EVERY MORNING
Status: DISCONTINUED | OUTPATIENT
Start: 2018-01-18 | End: 2018-01-19 | Stop reason: HOSPADM

## 2018-01-17 RX ORDER — GABAPENTIN 400 MG/1
400 CAPSULE ORAL 3 TIMES DAILY PRN
Status: DISCONTINUED | OUTPATIENT
Start: 2018-01-17 | End: 2018-01-19 | Stop reason: HOSPADM

## 2018-01-17 RX ORDER — MIDAZOLAM HYDROCHLORIDE 1 MG/ML
1 INJECTION INTRAMUSCULAR; INTRAVENOUS
Status: DISCONTINUED | OUTPATIENT
Start: 2018-01-17 | End: 2018-01-17 | Stop reason: HOSPADM

## 2018-01-17 RX ORDER — ACETAMINOPHEN 325 MG/1
325 TABLET ORAL EVERY 4 HOURS PRN
Status: DISCONTINUED | OUTPATIENT
Start: 2018-01-17 | End: 2018-01-19 | Stop reason: HOSPADM

## 2018-01-17 RX ORDER — NALOXONE HCL 0.4 MG/ML
0.1 VIAL (ML) INJECTION
Status: DISCONTINUED | OUTPATIENT
Start: 2018-01-17 | End: 2018-01-19 | Stop reason: HOSPADM

## 2018-01-17 RX ORDER — GLYCOPYRROLATE 0.2 MG/ML
INJECTION INTRAMUSCULAR; INTRAVENOUS AS NEEDED
Status: DISCONTINUED | OUTPATIENT
Start: 2018-01-17 | End: 2018-01-17 | Stop reason: SURG

## 2018-01-17 RX ORDER — PROMETHAZINE HYDROCHLORIDE 25 MG/ML
12.5 INJECTION, SOLUTION INTRAMUSCULAR; INTRAVENOUS ONCE AS NEEDED
Status: DISCONTINUED | OUTPATIENT
Start: 2018-01-17 | End: 2018-01-17 | Stop reason: HOSPADM

## 2018-01-17 RX ORDER — SODIUM CHLORIDE, SODIUM LACTATE, POTASSIUM CHLORIDE, CALCIUM CHLORIDE 600; 310; 30; 20 MG/100ML; MG/100ML; MG/100ML; MG/100ML
9 INJECTION, SOLUTION INTRAVENOUS CONTINUOUS
Status: DISCONTINUED | OUTPATIENT
Start: 2018-01-17 | End: 2018-01-19 | Stop reason: HOSPADM

## 2018-01-17 RX ORDER — SODIUM CHLORIDE, SODIUM LACTATE, POTASSIUM CHLORIDE, CALCIUM CHLORIDE 600; 310; 30; 20 MG/100ML; MG/100ML; MG/100ML; MG/100ML
100 INJECTION, SOLUTION INTRAVENOUS CONTINUOUS
Status: ACTIVE | OUTPATIENT
Start: 2018-01-17 | End: 2018-01-18

## 2018-01-17 RX ORDER — PROMETHAZINE HYDROCHLORIDE 25 MG/1
25 TABLET ORAL ONCE AS NEEDED
Status: DISCONTINUED | OUTPATIENT
Start: 2018-01-17 | End: 2018-01-17 | Stop reason: HOSPADM

## 2018-01-17 RX ORDER — LOSARTAN POTASSIUM 100 MG/1
100 TABLET ORAL EVERY MORNING
Status: DISCONTINUED | OUTPATIENT
Start: 2018-01-18 | End: 2018-01-19 | Stop reason: HOSPADM

## 2018-01-17 RX ORDER — FLUMAZENIL 0.1 MG/ML
0.2 INJECTION INTRAVENOUS AS NEEDED
Status: DISCONTINUED | OUTPATIENT
Start: 2018-01-17 | End: 2018-01-17 | Stop reason: HOSPADM

## 2018-01-17 RX ORDER — ONDANSETRON 2 MG/ML
4 INJECTION INTRAMUSCULAR; INTRAVENOUS ONCE AS NEEDED
Status: DISCONTINUED | OUTPATIENT
Start: 2018-01-17 | End: 2018-01-17 | Stop reason: HOSPADM

## 2018-01-17 RX ORDER — BISACODYL 5 MG/1
10 TABLET, DELAYED RELEASE ORAL DAILY PRN
Status: DISCONTINUED | OUTPATIENT
Start: 2018-01-17 | End: 2018-01-19 | Stop reason: HOSPADM

## 2018-01-17 RX ORDER — PROMETHAZINE HYDROCHLORIDE 12.5 MG/1
12.5 TABLET ORAL EVERY 6 HOURS PRN
Status: DISCONTINUED | OUTPATIENT
Start: 2018-01-17 | End: 2018-01-19 | Stop reason: HOSPADM

## 2018-01-17 RX ORDER — MAGNESIUM HYDROXIDE 1200 MG/15ML
LIQUID ORAL AS NEEDED
Status: DISCONTINUED | OUTPATIENT
Start: 2018-01-17 | End: 2018-01-17 | Stop reason: HOSPADM

## 2018-01-17 RX ORDER — EPHEDRINE SULFATE 50 MG/ML
5 INJECTION, SOLUTION INTRAVENOUS ONCE AS NEEDED
Status: DISCONTINUED | OUTPATIENT
Start: 2018-01-17 | End: 2018-01-17 | Stop reason: HOSPADM

## 2018-01-17 RX ORDER — HYDROMORPHONE HCL IN 0.9% NACL 10 MG/50ML
PATIENT CONTROLLED ANALGESIA SYRINGE INTRAVENOUS CONTINUOUS
Status: DISCONTINUED | OUTPATIENT
Start: 2018-01-17 | End: 2018-01-19 | Stop reason: HOSPADM

## 2018-01-17 RX ORDER — LIDOCAINE HYDROCHLORIDE 10 MG/ML
0.5 INJECTION, SOLUTION EPIDURAL; INFILTRATION; INTRACAUDAL; PERINEURAL ONCE AS NEEDED
Status: DISCONTINUED | OUTPATIENT
Start: 2018-01-17 | End: 2018-01-17 | Stop reason: HOSPADM

## 2018-01-17 RX ORDER — ROCURONIUM BROMIDE 10 MG/ML
INJECTION, SOLUTION INTRAVENOUS AS NEEDED
Status: DISCONTINUED | OUTPATIENT
Start: 2018-01-17 | End: 2018-01-17 | Stop reason: SURG

## 2018-01-17 RX ORDER — NALOXONE HCL 0.4 MG/ML
0.2 VIAL (ML) INJECTION AS NEEDED
Status: DISCONTINUED | OUTPATIENT
Start: 2018-01-17 | End: 2018-01-17 | Stop reason: HOSPADM

## 2018-01-17 RX ORDER — SODIUM CHLORIDE 0.9 % (FLUSH) 0.9 %
1-10 SYRINGE (ML) INJECTION AS NEEDED
Status: DISCONTINUED | OUTPATIENT
Start: 2018-01-17 | End: 2018-01-17 | Stop reason: HOSPADM

## 2018-01-17 RX ORDER — FENTANYL CITRATE 50 UG/ML
INJECTION, SOLUTION INTRAMUSCULAR; INTRAVENOUS AS NEEDED
Status: DISCONTINUED | OUTPATIENT
Start: 2018-01-17 | End: 2018-01-17 | Stop reason: SURG

## 2018-01-17 RX ORDER — BISACODYL 10 MG
10 SUPPOSITORY, RECTAL RECTAL DAILY PRN
Status: DISCONTINUED | OUTPATIENT
Start: 2018-01-17 | End: 2018-01-19 | Stop reason: HOSPADM

## 2018-01-17 RX ORDER — HYDROMORPHONE HCL 110MG/55ML
0.5 PATIENT CONTROLLED ANALGESIA SYRINGE INTRAVENOUS
Status: DISCONTINUED | OUTPATIENT
Start: 2018-01-17 | End: 2018-01-17 | Stop reason: HOSPADM

## 2018-01-17 RX ORDER — DEXAMETHASONE SODIUM PHOSPHATE 10 MG/ML
INJECTION INTRAMUSCULAR; INTRAVENOUS AS NEEDED
Status: DISCONTINUED | OUTPATIENT
Start: 2018-01-17 | End: 2018-01-17 | Stop reason: SURG

## 2018-01-17 RX ORDER — ONDANSETRON 4 MG/1
4 TABLET, FILM COATED ORAL EVERY 6 HOURS PRN
Status: DISCONTINUED | OUTPATIENT
Start: 2018-01-17 | End: 2018-01-19 | Stop reason: HOSPADM

## 2018-01-17 RX ORDER — DIPHENHYDRAMINE HCL 25 MG
25 CAPSULE ORAL EVERY 6 HOURS PRN
Status: DISCONTINUED | OUTPATIENT
Start: 2018-01-17 | End: 2018-01-19 | Stop reason: HOSPADM

## 2018-01-17 RX ORDER — NICOTINE 21 MG/24HR
1 PATCH, TRANSDERMAL 24 HOURS TRANSDERMAL EVERY 24 HOURS
Status: DISCONTINUED | OUTPATIENT
Start: 2018-01-17 | End: 2018-01-19 | Stop reason: HOSPADM

## 2018-01-17 RX ORDER — ONDANSETRON 4 MG/1
4 TABLET, ORALLY DISINTEGRATING ORAL EVERY 6 HOURS PRN
Status: DISCONTINUED | OUTPATIENT
Start: 2018-01-17 | End: 2018-01-19 | Stop reason: HOSPADM

## 2018-01-17 RX ORDER — CYCLOBENZAPRINE HCL 10 MG
10 TABLET ORAL 3 TIMES DAILY PRN
Status: DISCONTINUED | OUTPATIENT
Start: 2018-01-17 | End: 2018-01-19 | Stop reason: HOSPADM

## 2018-01-17 RX ORDER — CEFAZOLIN SODIUM 2 G/100ML
INJECTION, SOLUTION INTRAVENOUS AS NEEDED
Status: DISCONTINUED | OUTPATIENT
Start: 2018-01-17 | End: 2018-01-17 | Stop reason: SURG

## 2018-01-17 RX ORDER — HYDRALAZINE HYDROCHLORIDE 20 MG/ML
5 INJECTION INTRAMUSCULAR; INTRAVENOUS
Status: DISCONTINUED | OUTPATIENT
Start: 2018-01-17 | End: 2018-01-17 | Stop reason: HOSPADM

## 2018-01-17 RX ORDER — OXYCODONE AND ACETAMINOPHEN 7.5; 325 MG/1; MG/1
1 TABLET ORAL ONCE AS NEEDED
Status: COMPLETED | OUTPATIENT
Start: 2018-01-17 | End: 2018-01-17

## 2018-01-17 RX ORDER — DIPHENHYDRAMINE HYDROCHLORIDE 50 MG/ML
25 INJECTION INTRAMUSCULAR; INTRAVENOUS EVERY 6 HOURS PRN
Status: DISCONTINUED | OUTPATIENT
Start: 2018-01-17 | End: 2018-01-19 | Stop reason: HOSPADM

## 2018-01-17 RX ADMIN — Medication 2 MG: at 09:42

## 2018-01-17 RX ADMIN — SODIUM CHLORIDE, POTASSIUM CHLORIDE, SODIUM LACTATE AND CALCIUM CHLORIDE 9 ML/HR: 600; 310; 30; 20 INJECTION, SOLUTION INTRAVENOUS at 09:09

## 2018-01-17 RX ADMIN — HYDRALAZINE HYDROCHLORIDE 10 MG: 20 INJECTION INTRAMUSCULAR; INTRAVENOUS at 11:58

## 2018-01-17 RX ADMIN — GLYCOPYRROLATE 0.4 MG: 0.2 INJECTION INTRAMUSCULAR; INTRAVENOUS at 12:34

## 2018-01-17 RX ADMIN — SODIUM CHLORIDE, POTASSIUM CHLORIDE, SODIUM LACTATE AND CALCIUM CHLORIDE 100 ML/HR: 600; 310; 30; 20 INJECTION, SOLUTION INTRAVENOUS at 17:15

## 2018-01-17 RX ADMIN — HYDROMORPHONE HYDROCHLORIDE: 10 INJECTION INTRAMUSCULAR; INTRAVENOUS; SUBCUTANEOUS at 15:32

## 2018-01-17 RX ADMIN — FENTANYL CITRATE 50 MCG: 50 INJECTION INTRAMUSCULAR; INTRAVENOUS at 11:45

## 2018-01-17 RX ADMIN — SODIUM CHLORIDE, POTASSIUM CHLORIDE, SODIUM LACTATE AND CALCIUM CHLORIDE: 600; 310; 30; 20 INJECTION, SOLUTION INTRAVENOUS at 11:25

## 2018-01-17 RX ADMIN — OXYCODONE HYDROCHLORIDE AND ACETAMINOPHEN 1 TABLET: 7.5; 325 TABLET ORAL at 13:53

## 2018-01-17 RX ADMIN — CEFAZOLIN SODIUM 2 G: 10 INJECTION, POWDER, FOR SOLUTION INTRAVENOUS at 17:12

## 2018-01-17 RX ADMIN — DOCUSATE SODIUM 100 MG: 100 CAPSULE, LIQUID FILLED ORAL at 21:16

## 2018-01-17 RX ADMIN — EPHEDRINE SULFATE 10 MG: 50 INJECTION INTRAMUSCULAR; INTRAVENOUS; SUBCUTANEOUS at 12:37

## 2018-01-17 RX ADMIN — HYDRALAZINE HYDROCHLORIDE 10 MG: 20 INJECTION INTRAMUSCULAR; INTRAVENOUS at 11:53

## 2018-01-17 RX ADMIN — HYDROMORPHONE HYDROCHLORIDE 0.5 MG: 2 INJECTION, SOLUTION INTRAMUSCULAR; INTRAVENOUS; SUBCUTANEOUS at 13:03

## 2018-01-17 RX ADMIN — ROCURONIUM BROMIDE 10 MG: 10 INJECTION INTRAVENOUS at 11:45

## 2018-01-17 RX ADMIN — FENTANYL CITRATE 50 MCG: 50 INJECTION, SOLUTION INTRAMUSCULAR; INTRAVENOUS at 14:42

## 2018-01-17 RX ADMIN — RIVAROXABAN 10 MG: 10 TABLET, FILM COATED ORAL at 21:16

## 2018-01-17 RX ADMIN — HYDROMORPHONE HYDROCHLORIDE 0.5 MG: 2 INJECTION, SOLUTION INTRAMUSCULAR; INTRAVENOUS; SUBCUTANEOUS at 13:08

## 2018-01-17 RX ADMIN — FENTANYL CITRATE 50 MCG: 50 INJECTION INTRAMUSCULAR; INTRAVENOUS at 11:49

## 2018-01-17 RX ADMIN — ONDANSETRON HYDROCHLORIDE 4 MG: 4 TABLET, FILM COATED ORAL at 18:28

## 2018-01-17 RX ADMIN — Medication 2 MG: at 09:09

## 2018-01-17 RX ADMIN — HYDROMORPHONE HYDROCHLORIDE 0.5 MG: 2 INJECTION, SOLUTION INTRAMUSCULAR; INTRAVENOUS; SUBCUTANEOUS at 13:45

## 2018-01-17 RX ADMIN — LIDOCAINE HYDROCHLORIDE 100 MG: 20 INJECTION, SOLUTION INFILTRATION; PERINEURAL at 11:25

## 2018-01-17 RX ADMIN — ROCURONIUM BROMIDE 40 MG: 10 INJECTION INTRAVENOUS at 11:25

## 2018-01-17 RX ADMIN — FENTANYL CITRATE 50 MCG: 50 INJECTION INTRAMUSCULAR; INTRAVENOUS at 11:59

## 2018-01-17 RX ADMIN — HYDROMORPHONE HYDROCHLORIDE 0.5 MG: 2 INJECTION, SOLUTION INTRAMUSCULAR; INTRAVENOUS; SUBCUTANEOUS at 13:38

## 2018-01-17 RX ADMIN — FAMOTIDINE 20 MG: 10 INJECTION, SOLUTION INTRAVENOUS at 09:09

## 2018-01-17 RX ADMIN — FENTANYL CITRATE 50 MCG: 50 INJECTION INTRAMUSCULAR; INTRAVENOUS at 12:28

## 2018-01-17 RX ADMIN — FENTANYL CITRATE 50 MCG: 50 INJECTION, SOLUTION INTRAMUSCULAR; INTRAVENOUS at 13:20

## 2018-01-17 RX ADMIN — ONDANSETRON 4 MG: 2 INJECTION INTRAMUSCULAR; INTRAVENOUS at 12:33

## 2018-01-17 RX ADMIN — CEFAZOLIN SODIUM 2 G: 2 INJECTION, SOLUTION INTRAVENOUS at 11:30

## 2018-01-17 RX ADMIN — NEOSTIGMINE METHYLSULFATE 3 MG: 1 INJECTION INTRAMUSCULAR; INTRAVENOUS; SUBCUTANEOUS at 12:34

## 2018-01-17 RX ADMIN — MUPIROCIN: 20 OINTMENT TOPICAL at 21:16

## 2018-01-17 RX ADMIN — PROPOFOL 200 MG: 10 INJECTION, EMULSION INTRAVENOUS at 11:25

## 2018-01-17 RX ADMIN — DEXAMETHASONE SODIUM PHOSPHATE 4 MG: 10 INJECTION INTRAMUSCULAR; INTRAVENOUS at 11:37

## 2018-01-17 RX ADMIN — SODIUM CHLORIDE, POTASSIUM CHLORIDE, SODIUM LACTATE AND CALCIUM CHLORIDE 100 ML/HR: 600; 310; 30; 20 INJECTION, SOLUTION INTRAVENOUS at 20:27

## 2018-01-17 RX ADMIN — NICOTINE 1 PATCH: 21 PATCH, EXTENDED RELEASE TRANSDERMAL at 21:16

## 2018-01-17 NOTE — ANESTHESIA PROCEDURE NOTES
Airway  Urgency: elective    Airway not difficult    General Information and Staff    Patient location during procedure: OR  Anesthesiologist: DENIA GONZALEZ    Indications and Patient Condition  Indications for airway management: airway protection    Preoxygenated: yes  MILS maintained throughout  Mask difficulty assessment: 2 - vent by mask + OA or adjuvant +/- NMBA    Final Airway Details  Final airway type: endotracheal airway      Successful airway: ETT  Cuffed: yes   Successful intubation technique: direct laryngoscopy  Facilitating devices/methods: cricoid pressure  Endotracheal tube insertion site: oral  Blade: Lisa  Blade size: #3  ETT size: 7.0 mm  Cormack-Lehane Classification: grade IIa - partial view of glottis  Placement verified by: capnometry   Cuff volume (mL): 5  Measured from: lips  ETT to lips (cm): 20  Number of attempts at approach: 1

## 2018-01-17 NOTE — PLAN OF CARE
Problem: Patient Care Overview (Adult)  Goal: Plan of Care Review  Outcome: Ongoing (interventions implemented as appropriate)   01/17/18 6384   Coping/Psychosocial Response Interventions   Plan Of Care Reviewed With patient   Patient Care Overview   Progress improving   Outcome Evaluation   Outcome Summary/Follow up Plan Arrived to floor at 1600 VSS voided x 2. PCA available for pain Med x 1 for n/v Instructed on hypertension and need for freq BP checks       Problem: Perioperative Period (Adult)  Goal: Signs and Symptoms of Listed Potential Problems Will be Absent or Manageable (Perioperative Period)  Outcome: Ongoing (interventions implemented as appropriate)      Problem: Knee Replacement, Total (Adult)  Goal: Signs and Symptoms of Listed Potential Problems Will be Absent or Manageable (Knee Replacement, Total)  Outcome: Ongoing (interventions implemented as appropriate)      Problem: Fall Risk (Adult)  Goal: Identify Related Risk Factors and Signs and Symptoms  Outcome: Outcome(s) achieved Date Met: 01/17/18    Goal: Absence of Falls  Outcome: Ongoing (interventions implemented as appropriate)

## 2018-01-17 NOTE — ANESTHESIA PREPROCEDURE EVALUATION
Anesthesia Evaluation     Patient summary reviewed and Nursing notes reviewed   NPO Solid Status: > 8 hours  NPO Liquid Status: > 2 hours     Airway   Mallampati: I  TM distance: >3 FB  Neck ROM: full  no difficulty expected  Dental - normal exam   (+) edentulous    Pulmonary - normal exam   (+) a smoker Current Smoked day of surgery, COPD mild,   Cardiovascular - normal exam    ECG reviewed    (+) hypertension (one med) poorly controlled,       Neuro/Psych  GI/Hepatic/Renal/Endo      Musculoskeletal     Abdominal  - normal exam    Bowel sounds: normal.   Substance History   (+) alcohol use, drug use (marijuana daily)     OB/GYN          Other   (+) arthritis                                             Anesthesia Plan    ASA 2     Anesthetic plan and risks discussed with patient.

## 2018-01-17 NOTE — PROGRESS NOTES
Discharge Planning Assessment  [facility]     Patient Name: Makayla Boo  MRN: [id number]  Today's Date: 1/17/2018    Admit Date: 1/17/2018          Discharge Needs Assessment       01/17/18 1742    Living Environment    Lives With sibling(s);child(tao), dependent    Living Arrangements house    Home Accessibility stairs to enter home    Number of Stairs to Enter Home 3    Stair Railings at Home outside, present at both sides    Type of Financial/Environmental Concern none    Transportation Available car;family or friend will provide    Living Environment    Provides Primary Care For child(tao)    Quality Of Family Relationships supportive;helpful;involved    Able to Return to Prior Living Arrangements yes    Discharge Needs Assessment    Concerns To Be Addressed discharge planning concerns    Equipment Currently Used at Home none;walker, rolling    Equipment Needed After Discharge commode;raised toilet    Discharge Facility/Level Of Care Needs home with home health    Discharge Disposition home healthcare service    Discharge Planning Comments [facility]            Discharge Plan       01/17/18 1744    Case Management/Social Work Plan    Plan [facility]    Patient/Family In Agreement With Plan yes    Additional Comments IMM letter checked. CCP reviewed pre-operative ortho assessment completed on 1/3/18 (Francisca Raman RN). CCP met with pt to confirm d/c plan. Pt confirms plan to d/c home with home health and selects [facility] for referral. CCP placed referral in [facility] but could not leave [facility] as it was unavailable. Pt states she has a walker at home, but requests [facility] for d/c. CCP requested [facility] order in treatment team sticky note. CCP to follow to assist with additional needs should they arise. Leslie Boyd LCSW        Discharge Placement     Facility/Agency Request Status Selected? Address Phone Number Fax Number    [facility] Accepted    Yes [address] [place] [id number] [phone]         Payton Boyd LCSW 1/17/2018 17:24    Referral to [facility]                   Expected Discharge Date and Time     Expected Discharge Date Expected Discharge Time    Jan 18, 2018               Demographic Summary       01/17/18 1742    Referral Information    Admission Type inpatient    Arrived From admitted as an inpatient    Referral Source nursing;physician    Reason For Consult discharge planning    Record Reviewed medical record    Primary Care Physician Information    Name Madisyn James ESTELA            Functional Status       01/17/18 1742    Functional Status Current    Ambulation 3-->assistive equipment and person    Transferring 3-->assistive equipment and person    Toileting 3-->assistive equipment and person    Bathing 3-->assistive equipment and person    Dressing 0-->independent    Eating 0-->independent    Communication 0-->understands/communicates without difficulty    Swallowing (if score 2 or more for any item, consult Rehab Services) 0-->swallows foods/liquids without difficulty    Functional Status Prior    Ambulation 0-->independent    Transferring 0-->independent    Toileting 0-->independent    Bathing 0-->independent    Dressing 0-->independent    Eating 0-->independent    Communication 0-->understands/communicates without difficulty    Swallowing 0-->swallows foods/liquids without difficulty    Cognitive/Perceptual/Developmental    Current Mental Status/Cognitive Functioning no deficits noted            Psychosocial     None            Abuse/Neglect     None            Legal     None            Substance Abuse     None            Patient Forms     None          Payton Boyd LCSW

## 2018-01-17 NOTE — OP NOTE
Operative Note    Name: Makayla Boo  YOB: 1965  MRN: [id number]  BMI: Body mass index is 27.42 kg/(m^2).    DATE OF SURGERY: 1/17/2018    PREOPERATIVE DIAGNOSIS: right knee end-stage osteoarthritis    POSTOPERATIVE DIAGNOSIS: right knee end-stage osteoarthritis    PROCEDURE PERFORMED: right total knee replacement with [place] navigation    SURGEON: Dr. Arthur Higgins    ASSISTANT: ESTELA Vu    IMPLANTS: Depuy PFC size 3R femoral component, size 3 tibial baseplate, 8mm polyethylene insert, 38 mm patellar button    Estimated Blood Loss: 100cc  Specimens : none  Complications: none  22 Modifier:  stiffness and scarring and previously operated joint    DESCRIPTION OF PROCEDURE: The patient was taken to the operating room and placed in the supine position. Preoperative antibiotics were administered. Surgical time out was performed. After adequate induction of anesthesia, the leg was prepped and draped in the usual sterile fashion.  Surgical time out was performed. The leg was exsanguinated with an Esmarch bandage and the tourniquet inflated to 250 mmHg. A midline incision was performed followed by a medial parapatellar arthrotomy. The patella was subluxed laterally.  A portion of the fat pad, ACL, and anterior horns of the meniscus were excised.  The MPV navigation device was affixed and navigated. The distal cut was made. The femur was then sized with a sizing guide. The femoral cutting block was placed and all femoral cuts were performed. The proximal tibia was exposed. [place] navigation protocol was accomplished.  11 millimeters were removed.  We used the extramedullary tibial cutting guide set for removal of 3mm of bone off the low side. The tibial cut was performed. The posterior horns of the menisci were excised. The posterior osteophytes were removed. Flexion extension blocks were then used to balance the knee. The tibial cut surface was then sized with the sizing templates and the  tibial and femoral trial were then placed. The tray was aligned with the middle third of the tubercle.    Attention was then placed to the patella. The patella was balanced to track centrally through range of motion.  It measured 24 and patella resurfacing was performed.   At this point all trial components were removed, the knee was copiously irrigated with pulsed lavage, and the knee was injected with anesthetic cocktail solution. The cut surfaces were then dried with clean lap sponges, and the components were cemented tibia, followed by femura. The knee was held in full extension and all excess cement was removed. The knee was held still until the cement had completely hardened. We then placed the trial polyethylene spacer which resulted in full extension and excellent flexion-extension balance. We placed the final polyethylene spacer.   The knee was then copiously irrigated with the full 3 liters. The tourniquet was then released. There was excellent hemostasis. We closed the knee in multiple layers in standard fashion. Sterile dressing were applied. At the end of the case, the sponge and needle counts were reported as being correct. There were no known complications. The patient was then transported to the recovery room.    Surgical assistant performed retraction, suction, hemostasis, and specific limb positioning and manipulation required for accuracy of joint placement, and assistance in wound closure and dressing application.       Arthur Higgins M.D.

## 2018-01-17 NOTE — DISCHARGE PLACEMENT REQUEST
"Parth Jimenez (52 y.o. Female)     Date of Birth Social Security Number Address Home Phone MRN    1965  [address] [place], [place] [id number] [phone] [id number]    [employer] Marital Status          None Single       Admission Date Admission Type Admitting Provider Attending Provider Department, Room/Bed    1/17/18 Elective Arthur Higgins MD Makk, Stephen P, MD [address], P783/1    Discharge Date Discharge Disposition Discharge Destination                      Attending Provider: Arthur Higgins MD     Allergies:  Codeine, Adhesive Tape    Isolation:  None   Infection:  None   Code Status:  FULL    Ht:  166.4 cm (65.5\")   Wt:  75.9 kg (167 lb 4.8 oz)    Admission Cmt:  None   Principal Problem:  Arthritis of right knee [M17.11]                 Active Insurance as of 1/17/2018     Primary Coverage     Payor Plan Insurance Group Employer/Plan Group    [facility] MEDICARE REPLACEMENT [facility]      Payor Plan Address Payor Plan Phone Number Effective From Effective To    [address] [address] [phone] 10/11/2017     [place] [address]       Subscriber Name Subscriber Birth Date Member ID       PARTH JIMENEZ 1965 [id number]           Secondary Coverage     Payor Plan Insurance Group Employer/Plan Group    [employer]     Payor Plan Address Payor Plan Phone Number Effective From Effective To    [address] [phone] 10/1/2016     [place] [address]       Subscriber Name Subscriber Birth Date Member ID       PARTH JIMENEZ 1965 [id number]                 Emergency Contacts      (Rel.) Home Phone Work Phone Mobile Phone    Annalisa Pedro (Mother) [phone] -- --              "

## 2018-01-17 NOTE — ANESTHESIA POSTPROCEDURE EVALUATION
Patient: Makayla Boo    Procedure Summary     Date Anesthesia Start Anesthesia Stop Room / Location    01/17/18 1123 1304  [facility] OR [facility] /  [facility]       Procedure Diagnosis Surgeon Provider    RIGHT TOTAL KNEE ARTHROPLASTY WITH [place] NAVIGATION (Right Knee) Arthritis of right knee  (Arthritis of right knee [M17.11]) MD Stu Lemus MD          Anesthesia Type: No value filed.  Last vitals  BP   106/65 (01/17/18 1445)   Temp   36.7 °C (98 °F) (01/17/18 1305)   Pulse   63 (01/17/18 1445)   Resp   14 (01/17/18 1445)     SpO2   97 % (01/17/18 1445)     Post Anesthesia Care and Evaluation    Patient location during evaluation: PACU  Patient participation: complete - patient participated  Level of consciousness: awake and alert  Pain management: adequate  Airway patency: patent  Anesthetic complications: No anesthetic complications    Cardiovascular status: acceptable  Respiratory status: acceptable  Hydration status: acceptable    Comments: --------------------            01/17/18               1445     --------------------   BP:       106/65     Pulse:      63       Resp:       14       Temp:                SpO2:      97%      --------------------

## 2018-01-17 NOTE — PLAN OF CARE
Problem: Patient Care Overview (Adult)  Goal: Plan of Care Review  Outcome: Ongoing (interventions implemented as appropriate)   01/17/18 0846   Coping/Psychosocial Response Interventions   Plan Of Care Reviewed With patient   Patient Care Overview   Progress no change     Goal: Adult Individualization and Mutuality  Outcome: Ongoing (interventions implemented as appropriate)   01/17/18 0846   Individualization   Patient Specific Preferences none     Goal: Discharge Needs Assessment  Outcome: Ongoing (interventions implemented as appropriate)   01/17/18 0846   Discharge Needs Assessment   Concerns To Be Addressed no discharge needs identified;denies needs/concerns at this time   Equipment Needed After Discharge walker, rolling;crutches   Self-Care   Equipment Currently Used at Home none       Problem: Perioperative Period (Adult)  Goal: Signs and Symptoms of Listed Potential Problems Will be Absent or Manageable (Perioperative Period)  Outcome: Ongoing (interventions implemented as appropriate)   01/17/18 0846   Perioperative Period   Problems Assessed (Perioperative Period) pain;infection;physiologic stress response   Problems Present (Perioperative Period) pain;physiologic stress response

## 2018-01-17 NOTE — H&P (VIEW-ONLY)
History & Physical       Patient: Makayla Boo  YOB: 1965  Medical Record Number: [id number]  Body mass index is 27.37 kg/(m^2).    Surgeon:  Dr. Arthur Higgins    Chief Complaints:   Chief Complaint   Patient presents with   • Right Knee - Pre-op Exam, Pain       Subjective:    History of Present Illness: 52 y.o. female presents with   Chief Complaint   Patient presents with   • Right Knee - Pre-op Exam, Pain   . Onset of symptoms was years ago and has been progressively worsening despite more conservative treatment measures.  Symptoms are associated with ability to move, exercise, and perform activities of daily living.  Symptoms are aggravated by weight bearing and ROM necessary for activities of daily living.   Symptoms improve with rest, ice and elevation only minimally.      Allergies:   Allergies   Allergen Reactions   • Codeine Nausea And Vomiting   • Adhesive Tape Rash       Medications:   Home Medications:  Current Outpatient Prescriptions on File Prior to Visit   Medication Sig   • cephalexin (KEFLEX) 500 MG capsule Take 500 mg by mouth Daily.   • Chlorhexidine Gluconate Cloth 2 % pads Apply  topically. As directed pre op   • cyclobenzaprine (FLEXERIL) 10 MG tablet Take 10 mg by mouth 3 (Three) Times a Day As Needed for Muscle Spasms.   • ferrous sulfate 325 (65 FE) MG tablet Take 325 mg by mouth As Needed.   • FLUoxetine (PROzac) 20 MG capsule Take 60 mg by mouth Every Morning.   • folic acid (FOLVITE) 400 MCG tablet Take 400 mcg by mouth Daily.   • gabapentin (NEURONTIN) 400 MG capsule Take 400 mg by mouth 3 (Three) Times a Day As Needed.   • hydroxychloroquine (PLAQUENIL) 200 MG tablet Take 200 mg by mouth 2 (Two) Times a Day.   • losartan (COZAAR) 100 MG tablet Take 100 mg by mouth Every Morning.   • mupirocin (BACTROBAN) 2 % nasal ointment into each nostril. As directed pre op   • vitamin B-12 (CYANOCOBALAMIN) 100 MCG tablet Take 50 mcg by mouth 2 (Two) Times a Day.   • vitamin E 400  UNIT capsule Take 400 Units by mouth Daily. TO HOLD 1 WEEK BEFORE SURGERY     No current facility-administered medications on file prior to visit.      Current Medications:  Scheduled Meds:  Continuous Infusions:  No current facility-administered medications for this visit.   PRN Meds:.    I have reviewed the patient's medical history in detail and updated the computerized patient record.  Review and summarization of old records include:    Past Medical History:   Diagnosis Date   • Anxiety    • Arthritis     RA   • COPD (chronic obstructive pulmonary disease)    • Depression    • Heart murmur    • Hepatitis C carrier    • History of sepsis     RT HAND/WIRST 2016   • History of transfusion    • Hypertension    • Knee pain         Past Surgical History:   Procedure Laterality Date   • BUNIONECTOMY Bilateral    • CARPAL TUNNEL RELEASE Bilateral    • CERVICAL FUSION     • INCISION AND DRAINAGE ABSCESS      RT HAND/WRIST X1   • KNEE ARTHROSCOPY     • KNEE ARTHROSCOPY W/ ACL RECONSTRUCTION Right    • ORIF FINGER / THUMB FRACTURE Right         Social History     Occupational History   • Not on file.     Social History Main Topics   • Smoking status: Current Every Day Smoker     Packs/day: 0.50     Years: 30.00     Types: Cigarettes   • Smokeless tobacco: Never Used   • Alcohol use Yes      Comment: social   • Drug use: Yes     Special: Marijuana      Comment: DAILY   • Sexual activity: Defer    Social History     Social History Narrative        Family History   Problem Relation Age of Onset   • Malig Hyperthermia Neg Hx        ROS: 14 point review of systems was performed and was negative except for documented findings in HPI and today's encounter.     Allergies:   Allergies   Allergen Reactions   • Codeine Nausea And Vomiting   • Adhesive Tape Rash     Constitutional:  Denies fever, shaking or chills   Eyes:  Denies change in visual acuity   HENT:  Denies nasal congestion or sore throat   Respiratory:  Denies cough or  shortness of breath   Cardiovascular:  Denies chest pain or severe LE edema   GI:  Denies abdominal pain, nausea, vomiting, bloody stools or diarrhea   Musculoskeletal:  Denies numbness tingling or loss of motor function except as outlined above in history of present illness.  : Denies painful urination or hematuria  Integument:  Denies rash, lesion or ulceration   Neurologic:  Denies headache or focal weakness  Endocrine:  Denies lymphadenopathy  Psych:  Denies confusion or change in mental status   Hem:  Denies active bleeding    Physical Exam: 52 y.o. female  Body mass index is 27.37 kg/(m^2)., @HT@, @WT@  Vitals:    01/11/18 0934   Temp: 97.8 °F (36.6 °C)     Vital signs reviewed.   General Appearance:    Alert, cooperative, in no acute distress                  Eyes: conjunctiva clear  ENT: external ears and nose atraumatic  CV: no peripheral edema  Resp: normal respiratory effort  Skin: no rashes or wounds; normal turgor  Psych: mood and affect appropriate  Lymph: no nodes appreciated  Neuro: gross sensation intact  Vascular:  Palpable peripheral pulse in noted extremity  Musculoskeletal Extremities: DETAILED KNEE Exam: Right knee: Painful gait w/wo limp, muscle atrophy, erythema, ecchymosis, or gross deformity noted, Large knee effusion, + lateral joint line tenderness, Active range of motion flexion contracture and approx 5 degrees, 5/5 strength flexion and extension, The knee is stable to varus and valgus stress testing, VARUS VALGUS NEUTRAL: valgus alignment of the limb, Lachman negative, Posterior drawer negative, Ryan's negative, Patellofemoral grind +, Sensation grossly intact to light tough throughout the lower extremity, Skin is intact, Distal pulses are palpable, No signs or symptoms of DVT          Diagnostic Tests:  No visits with results within 2 Week(s) from this visit.  Latest known visit with results is:    Appointment on 12/21/2017   Component Date Value Ref Range Status   • Glucose  12/21/2017 108* 65 - 99 mg/dL Final   • BUN 12/21/2017 14  6 - 20 mg/dL Final   • Creatinine 12/21/2017 0.82  0.57 - 1.00 mg/dL Final   • Sodium 12/21/2017 139  136 - 145 mmol/L Final   • Potassium 12/21/2017 4.4  3.5 - 5.2 mmol/L Final   • Chloride 12/21/2017 101  98 - 107 mmol/L Final   • CO2 12/21/2017 26.2  22.0 - 29.0 mmol/L Final   • Calcium 12/21/2017 9.7  8.6 - 10.5 mg/dL Final   • eGFR Non  Amer 12/21/2017 74  >60 mL/min/1.73 Final   • BUN/Creatinine Ratio 12/21/2017 17.1  7.0 - 25.0 Final   • Anion Gap 12/21/2017 11.8  mmol/L Final   • WBC 12/21/2017 7.55  4.50 - 10.70 10*3/mm3 Final   • RBC 12/21/2017 4.64  3.90 - 5.20 10*6/mm3 Final   • Hemoglobin 12/21/2017 14.2  11.9 - 15.5 g/dL Final   • Hematocrit 12/21/2017 42.7  35.6 - 45.5 % Final   • MCV 12/21/2017 92.0  80.5 - 98.2 fL Final   • MCH 12/21/2017 30.6  26.9 - 32.0 pg Final   • MCHC 12/21/2017 33.3  32.4 - 36.3 g/dL Final   • RDW 12/21/2017 12.8  11.7 - 13.0 % Final   • RDW-SD 12/21/2017 42.9  37.0 - 54.0 fl Final   • MPV 12/21/2017 10.6  6.0 - 12.0 fL Final   • Platelets 12/21/2017 248  140 - 500 10*3/mm3 Final   • Color, UA 12/21/2017 Yellow  Yellow, Straw Final   • Appearance, UA 12/21/2017 Clear  Clear Final   • pH, UA 12/21/2017 6.0  5.0 - 8.0 Final   • Specific Gravity, UA 12/21/2017 1.019  1.005 - 1.030 Final   • Glucose, UA 12/21/2017 Negative  Negative Final   • Ketones, UA 12/21/2017 Negative  Negative Final   • Bilirubin, UA 12/21/2017 Negative  Negative Final   • Blood, UA 12/21/2017 Negative  Negative Final   • Protein, UA 12/21/2017 Negative  Negative Final   • Leuk Esterase, UA 12/21/2017 Negative  Negative Final   • Nitrite, UA 12/21/2017 Negative  Negative Final   • Urobilinogen, UA 12/21/2017 0.2 E.U./dL  0.2 - 1.0 E.U./dL Final     No results found.    Imaging was done previously in the office, images were personally viewed and discussed at length with the patient:    Indication: pain related symptoms,  Views: 3V AP, LAT &  40 degree PA right knee(s)   Findings: severe end-stage arthritis (bone on bone, subchondral sclerosis/cysts, osteophytes)  Comparison views: viewed last xray done in the office.     Assessment:  Patient Active Problem List   Diagnosis   • Chronic pain of right knee   • Arthritis of right knee   • Rheumatoid arthritis involving right knee with positive rheumatoid factor       Plan:  Dr. Arthur Higgnis reviewed anatomy of a total joint arthroplasty in laymen's terms, as well as typical postoperative recovery and possibly 6-12 months for maximal recovery, and possible need for rehabilitation stay after hospitalization. We also discussed risks, benefits, alternatives, and limitations of procedure with risks including but not limited to neurovascular damage, bleeding, infection, malalignment, chronic pian, failure of implants, osteolysis, loosening of implants, loss of motion, weakness, stiffness, instability, DVT, pulmonary embolus, death, stroke, complex regional pain syndrome, myocardial infarction, and need for additional procedures. Concept of substitution vs. replacement discussed.  No guarantees were given regarding results of surgery.      Makayla Boo was given the opportunity to ask and have all questions answered today.  The patient voiced understanding of the risks, benefits, and alternative forms of treatment that were discussed and the patient consents to proceed with surgery.     Patient's blood clot history is positive years ago was told that she had a blood clot in her calf but is not normally on blood thinner of any kind and isnt sure she really had one.   Planned DVT prophylaxis for surgery:  Oly James at [facility] infectious disease cleared for surgery after MRSA infection of right wrist that is now healed and completely clear.     Discharge Plan: POD 2-3 to home and home health    Patient was seen by ESTELA Vu in the office today.    Date: 1/11/2018  ESTELA Hernandez

## 2018-01-18 LAB
ANION GAP SERPL CALCULATED.3IONS-SCNC: 14.3 MMOL/L
BUN BLD-MCNC: 15 MG/DL (ref 6–20)
BUN/CREAT SERPL: 16.7 (ref 7–25)
CALCIUM SPEC-SCNC: 8.9 MG/DL (ref 8.6–10.5)
CHLORIDE SERPL-SCNC: 97 MMOL/L (ref 98–107)
CO2 SERPL-SCNC: 21.7 MMOL/L (ref 22–29)
CREAT BLD-MCNC: 0.9 MG/DL (ref 0.57–1)
GFR SERPL CREATININE-BSD FRML MDRD: 66 ML/MIN/1.73
GLUCOSE BLD-MCNC: 113 MG/DL (ref 65–99)
HCT VFR BLD AUTO: 34.2 % (ref 35.6–45.5)
HGB BLD-MCNC: 11.1 G/DL (ref 11.9–15.5)
POTASSIUM BLD-SCNC: 4 MMOL/L (ref 3.5–5.2)
SODIUM BLD-SCNC: 133 MMOL/L (ref 136–145)

## 2018-01-18 PROCEDURE — 97162 PT EVAL MOD COMPLEX 30 MIN: CPT

## 2018-01-18 PROCEDURE — 97150 GROUP THERAPEUTIC PROCEDURES: CPT

## 2018-01-18 PROCEDURE — 85014 HEMATOCRIT: CPT | Performed by: ORTHOPAEDIC SURGERY

## 2018-01-18 PROCEDURE — 25010000002 CEFAZOLIN PER 500 MG: Performed by: ORTHOPAEDIC SURGERY

## 2018-01-18 PROCEDURE — 25010000002 ONDANSETRON PER 1 MG: Performed by: ORTHOPAEDIC SURGERY

## 2018-01-18 PROCEDURE — 80048 BASIC METABOLIC PNL TOTAL CA: CPT | Performed by: ORTHOPAEDIC SURGERY

## 2018-01-18 PROCEDURE — 97110 THERAPEUTIC EXERCISES: CPT

## 2018-01-18 PROCEDURE — 85018 HEMOGLOBIN: CPT | Performed by: ORTHOPAEDIC SURGERY

## 2018-01-18 PROCEDURE — 99024 POSTOP FOLLOW-UP VISIT: CPT | Performed by: NURSE PRACTITIONER

## 2018-01-18 RX ORDER — ONDANSETRON 4 MG/1
4 TABLET, ORALLY DISINTEGRATING ORAL EVERY 6 HOURS PRN
Qty: 20 TABLET | Refills: 3 | Status: SHIPPED | OUTPATIENT
Start: 2018-01-18 | End: 2018-03-02

## 2018-01-18 RX ORDER — OXYCODONE AND ACETAMINOPHEN 7.5; 325 MG/1; MG/1
TABLET ORAL
Qty: 100 TABLET | Refills: 0
Start: 2018-01-18 | End: 2018-02-01 | Stop reason: SDUPTHER

## 2018-01-18 RX ORDER — PSEUDOEPHEDRINE HCL 30 MG
100 TABLET ORAL 2 TIMES DAILY
Qty: 60 CAPSULE | Refills: 3 | Status: SHIPPED | OUTPATIENT
Start: 2018-01-18 | End: 2018-03-02

## 2018-01-18 RX ORDER — BISACODYL 5 MG/1
10 TABLET, DELAYED RELEASE ORAL DAILY PRN
Qty: 30 TABLET | Refills: 3 | Status: SHIPPED | OUTPATIENT
Start: 2018-01-18 | End: 2018-03-02

## 2018-01-18 RX ADMIN — OXYCODONE HYDROCHLORIDE AND ACETAMINOPHEN 2 TABLET: 7.5; 325 TABLET ORAL at 06:51

## 2018-01-18 RX ADMIN — POLYETHYLENE GLYCOL 3350 17 G: 17 POWDER, FOR SOLUTION ORAL at 08:16

## 2018-01-18 RX ADMIN — MUPIROCIN: 20 OINTMENT TOPICAL at 08:16

## 2018-01-18 RX ADMIN — CEFAZOLIN SODIUM 2 G: 10 INJECTION, POWDER, FOR SOLUTION INTRAVENOUS at 00:07

## 2018-01-18 RX ADMIN — RIVAROXABAN 10 MG: 10 TABLET, FILM COATED ORAL at 17:03

## 2018-01-18 RX ADMIN — ONDANSETRON 4 MG: 2 INJECTION INTRAMUSCULAR; INTRAVENOUS at 06:51

## 2018-01-18 RX ADMIN — OXYCODONE HYDROCHLORIDE AND ACETAMINOPHEN 2 TABLET: 7.5; 325 TABLET ORAL at 17:52

## 2018-01-18 RX ADMIN — CYCLOBENZAPRINE HYDROCHLORIDE 10 MG: 10 TABLET, FILM COATED ORAL at 12:55

## 2018-01-18 RX ADMIN — ONDANSETRON 4 MG: 2 INJECTION INTRAMUSCULAR; INTRAVENOUS at 00:07

## 2018-01-18 RX ADMIN — OXYCODONE HYDROCHLORIDE AND ACETAMINOPHEN 2 TABLET: 7.5; 325 TABLET ORAL at 13:37

## 2018-01-18 RX ADMIN — OXYCODONE HYDROCHLORIDE AND ACETAMINOPHEN 2 TABLET: 7.5; 325 TABLET ORAL at 10:29

## 2018-01-18 RX ADMIN — ONDANSETRON 4 MG: 2 INJECTION INTRAMUSCULAR; INTRAVENOUS at 23:24

## 2018-01-18 RX ADMIN — CEFAZOLIN SODIUM 2 G: 10 INJECTION, POWDER, FOR SOLUTION INTRAVENOUS at 08:16

## 2018-01-18 RX ADMIN — DOCUSATE SODIUM 100 MG: 100 CAPSULE, LIQUID FILLED ORAL at 20:41

## 2018-01-18 RX ADMIN — FLUOXETINE HYDROCHLORIDE 60 MG: 20 CAPSULE ORAL at 08:17

## 2018-01-18 RX ADMIN — OXYCODONE HYDROCHLORIDE AND ACETAMINOPHEN 2 TABLET: 7.5; 325 TABLET ORAL at 02:27

## 2018-01-18 RX ADMIN — MUPIROCIN: 20 OINTMENT TOPICAL at 20:41

## 2018-01-18 RX ADMIN — SODIUM CHLORIDE, POTASSIUM CHLORIDE, SODIUM LACTATE AND CALCIUM CHLORIDE 9 ML/HR: 600; 310; 30; 20 INJECTION, SOLUTION INTRAVENOUS at 07:38

## 2018-01-18 RX ADMIN — OXYCODONE HYDROCHLORIDE AND ACETAMINOPHEN 2 TABLET: 7.5; 325 TABLET ORAL at 23:04

## 2018-01-18 RX ADMIN — NICOTINE 1 PATCH: 21 PATCH, EXTENDED RELEASE TRANSDERMAL at 17:02

## 2018-01-18 NOTE — PLAN OF CARE
Problem: Patient Care Overview (Adult)  Goal: Plan of Care Review  Outcome: Ongoing (interventions implemented as appropriate)   01/18/18 2946   Coping/Psychosocial Response Interventions   Plan Of Care Reviewed With patient   Patient Care Overview   Progress improving   Outcome Evaluation   Outcome Summary/Follow up Plan pain under control and ambulating well with assistance. patiewnt educated about htn and htn mediication     Goal: Adult Individualization and Mutuality  Outcome: Ongoing (interventions implemented as appropriate)    Goal: Discharge Needs Assessment  Outcome: Ongoing (interventions implemented as appropriate)      Problem: Perioperative Period (Adult)  Goal: Signs and Symptoms of Listed Potential Problems Will be Absent or Manageable (Perioperative Period)  Outcome: Ongoing (interventions implemented as appropriate)      Problem: Knee Replacement, Total (Adult)  Goal: Signs and Symptoms of Listed Potential Problems Will be Absent or Manageable (Knee Replacement, Total)  Outcome: Ongoing (interventions implemented as appropriate)      Problem: Fall Risk (Adult)  Goal: Absence of Falls  Outcome: Ongoing (interventions implemented as appropriate)

## 2018-01-18 NOTE — PLAN OF CARE
Problem: Patient Care Overview (Adult)  Goal: Plan of Care Review  Outcome: Ongoing (interventions implemented as appropriate)   01/18/18 0608   Coping/Psychosocial Response Interventions   Plan Of Care Reviewed With patient   Patient Care Overview   Progress improving   Outcome Evaluation   Outcome Summary/Follow up Plan VSS, pain increasing this AM, PCA in place, Percocets given with good results, up with assist of 1 to BSC, encouraged IS, educated on BP monitoring, home on DC      Goal: Adult Individualization and Mutuality  Outcome: Ongoing (interventions implemented as appropriate)    Goal: Discharge Needs Assessment  Outcome: Ongoing (interventions implemented as appropriate)      Problem: Perioperative Period (Adult)  Goal: Signs and Symptoms of Listed Potential Problems Will be Absent or Manageable (Perioperative Period)  Outcome: Ongoing (interventions implemented as appropriate)      Problem: Knee Replacement, Total (Adult)  Goal: Signs and Symptoms of Listed Potential Problems Will be Absent or Manageable (Knee Replacement, Total)  Outcome: Ongoing (interventions implemented as appropriate)      Problem: Fall Risk (Adult)  Goal: Absence of Falls  Outcome: Ongoing (interventions implemented as appropriate)

## 2018-01-18 NOTE — PLAN OF CARE
Problem: Patient Care Overview (Adult)  Goal: Plan of Care Review   01/18/18 0943   Coping/Psychosocial Response Interventions   Plan Of Care Reviewed With patient   Outcome Evaluation   Outcome Summary/Follow up Plan Patient is a pleasant 52 y.o. female s/p R TKA with expected post op weakness and impaired funct mobility. Patient is independent with all ADLs at baseline and does not use an AD. Assist x 1 required for all mobility. Ambulated 115' with RW. Patient will benefit from skilled PT services acutely to address deficits as able and improve level of independence. BSC ordered 1/18. Anticipate DC home tomorrow with  PT to follow up for continued rehabilitation.       Problem: Inpatient Physical Therapy  Goal: Transfer Training Goal 1 LTG- PT   01/18/18 0943   Transfer Training PT LTG   Transfer Training PT LTG, Date Established 01/18/18   Transfer Training PT LTG, Time to Achieve 1 wk   Transfer Training PT LTG, Activity Type all transfers   Transfer Training PT LTG, [place] Level supervision required   Transfer Training PT LTG, Assist Device walker, rolling     Goal: Gait Training Goal LTG- PT   01/18/18 0943   Gait Training PT LTG   Gait Training Goal PT LTG, Date Established 01/18/18   Gait Training Goal PT LTG, Time to Achieve 1 wk   Gait Training Goal PT LTG, [place] Level supervision required   Gait Training Goal PT LTG, Assist Device walker, rolling   Gait Training Goal PT LTG, Distance to Achieve 150     Goal: Stair Training Goal LTG- PT   01/18/18 0943   Stair Training PT LTG   Stair Training Goal PT LTG, Date Established 01/18/18   Stair Training Goal PT LTG, Time to Achieve 1 wk   Stair Training Goal PT LTG, Number of Steps 3   Stair Training Goal PT LTG, [place] Level contact guard assist   Stair Training Goal PT LTG, Assist Device 1 handrail     Goal: Range of Motion Goal LTG- PT   01/18/18 0943   Range of Motion PT LTG   Range of Motion Goal PT LTG, Date Established 01/18/18    Range of Motion Goal PT LTG, Time to Achieve 1 wk   Range fo Motion Goal PT LTG, Joint R knee   Range of Motion Goal PT LTG, AROM Measure 5-90'

## 2018-01-18 NOTE — THERAPY EVALUATION
Acute Care - Physical Therapy Initial Evaluation  [facility]     Patient Name: Makayla oBo  : 1965  MRN: [id number]  Today's Date: 2018   Onset of Illness/Injury or Date of Surgery Date: 18 (s/p R TKA)  Date of Referral to PT: 18  Referring Physician: Dr. Higgins      Admit Date: 2018     Visit Dx:    ICD-10-CM ICD-9-CM   1. Status post total right knee replacement Z96.651 V43.65   2. Arthritis of right knee M17.11 716.96     Patient Active Problem List   Diagnosis   • Chronic pain of right knee   • Arthritis of right knee   • Rheumatoid arthritis involving right knee with positive rheumatoid factor     Past Medical History:   Diagnosis Date   • Anxiety    • Arthritis     RA   • COPD (chronic obstructive pulmonary disease)    • Depression    • Heart murmur    • Hepatitis C carrier    • History of MRSA infection 2016    right wrist   • History of sepsis     RT HAND/WIRST 2016   • History of transfusion    • Hypertension    • Knee pain      Past Surgical History:   Procedure Laterality Date   • BUNIONECTOMY Bilateral    • CARPAL TUNNEL RELEASE Bilateral    • CERVICAL FUSION     • INCISION AND DRAINAGE ABSCESS      RT HAND/WRIST X1   • KNEE ARTHROSCOPY     • KNEE ARTHROSCOPY W/ ACL RECONSTRUCTION Right    • ORIF FINGER / THUMB FRACTURE Right           PT ASSESSMENT (last 72 hours)      PT Evaluation       18 0900 18 [id number]    Rehab Evaluation    Document Type evaluation  -MA     Subjective Information agree to therapy;complains of;weakness;fatigue  -MA     Patient Effort, Rehab Treatment adequate  -MA     Symptoms Noted During/After Treatment fatigue;increased pain  -MA     General Information    Patient Profile Review yes  -MA     Onset of Illness/Injury or Date of Surgery Date 18   s/p R TKA  -MA     Referring Physician Dr. Higgins  -MA     General Observations supine in bed with HOB elevated, ice applied, SCD applied, no acute distress noted at rest  -MA     Pertinent History  Of Current Problem s/p R TKA  -MA     Precautions/Limitations fall precautions  -MA     Prior Level of Function independent:;all household mobility  -MA     Equipment Currently Used at Home  none;walker, rolling  -    Plans/Goals Discussed With patient;agreed upon  -MA     Benefits Reviewed patient:;improve function;increase independence  -MA     Barriers to Rehab none identified  -MA     Living Environment    Lives With  sibling(s);child(tao), dependent  -    Living Arrangements  house  -    Home Accessibility  stairs to enter home  -    Number of Stairs to Enter Home  3  -JM    Stair Railings at Home  outside, present at both sides  -    Type of Financial/Environmental Concern  none  -    Transportation Available  car;family or friend will provide  -    Clinical Impression    Date of Referral to PT 01/18/18  -MA     PT Diagnosis impaired funct mobility 2' post op  -MA     Criteria for Skilled Therapeutic Interventions Met yes;treatment indicated  -MA     Pathology/Pathophysiology Noted (Describe Specifically for Each System) musculoskeletal  -MA     Impairments Found (describe specific impairments) aerobic capacity/endurance;gait, locomotion, and balance;ROM  -MA     Rehab Potential good, to achieve stated therapy goals  -MA     Vital Signs    Pretreatment Heart Rate (beats/min) 78  -MA     Pre SpO2 (%) 96  -MA     O2 Delivery Pre Treatment room air  -MA     Post SpO2 (%) 95  -MA     O2 Delivery Post Treatment room air  -MA     Pain Assessment    Pain Assessment 0-10  -MA     Pain Score 3  -MA     Post Pain Score 8  -MA     Pain Location Knee  -MA     Pain Orientation Right  -MA     Pain Intervention(s) Cold applied;Repositioned;Ambulation/increased activity;Rest  -MA     Response to Interventions tolerated  -MA     Vision Assessment/Intervention    Visual Impairment WFL  -MA     Cognitive Assessment/Intervention    Current Cognitive/Communication Assessment functional  -MA     Orientation Status  oriented x 4  -MA     Follows Commands/Answers Questions 100% of the time;able to follow multi-step instructions  -MA     Personal Safety WNL/WFL;good awareness, safety precautions  -MA     Personal Safety Interventions fall prevention program maintained;gait belt;nonskid shoes/slippers when out of bed  -MA     ROM (Range of Motion)    General ROM Detail R LE limited 2' soreness  -MA     MMT (Manual Muscle Testing)    General MMT Assessment Detail R LE post op weakness  -MA     Mobility Assessment/Training    Extremity Weight-Bearing Status right lower extremity  -MA     Right Lower Extremity Weight-Bearing weight-bearing as tolerated  -MA     Bed Mobility, Assessment/Treatment    Bed Mobility, Assistive Device head of bed elevated  -MA     Bed Mob, Supine to Sit, [place] supervision required  -MA     Bed Mob, Sit to Supine, [place] not tested  -MA     Transfer Assessment/Treatment    Transfers, Sit-Stand [place] contact guard assist  -MA     Transfers, Stand-Sit [place] contact guard assist  -MA     Transfers, Sit-Stand-Sit, Assist Device rolling walker  -MA     Toilet Transfer, [place] contact guard assist  -MA     Toilet Transfer, Assistive Device bedside commode with drop arms;rolling walker  -MA     Transfer, Safety Issues sequencing ability decreased;step length decreased;weight-shifting ability decreased  -MA     Transfer, Impairments pain;strength decreased  -MA     Transfer, Comment Cues for hand placement  -MA     Gait Assessment/Treatment    Gait, [place] Level contact guard assist  -MA     Gait, Assistive Device rolling walker  -MA     Gait, Distance (Feet) 115  -MA     Gait, Gait Pattern Analysis 3-point gait  -MA     Gait, Gait Deviations right:;antalgic;step length decreased;stride length decreased;waylon decreased;decreased heel strike  -MA     Gait, Safety Issues sequencing ability decreased;step length decreased;weight-shifting ability decreased  -MA     Gait,  Impairments pain;strength decreased  -MA     Gait, Comment Distance limited 2' fatigue  -MA     Therapy Exercises    Exercise Protocols total knee  -MA     Total Knee Exercises right:;10 reps;completed protocol  -MA     Positioning and Restraints    Pre-Treatment Position in bed  -MA     Post Treatment Position chair  -MA     In Chair notified nsg;sitting;call light within reach;encouraged to call for assist;legs elevated   nsg notified regarding ice pack  -MA       01/17/18 1656 01/17/18 0846    General Information    Equipment Currently Used at Home none  -NARESH none  -TW      01/17/18 0841       General Information    Equipment Currently Used at Home none  -TW     Living Environment    Lives With sibling(s);child(tao), dependent   Sister and granddaughter.   -TW     Living Arrangements house  -TW     Home Accessibility stairs within home;no concerns  -TW     Transportation Available car;family or friend will provide  -TW       User Key  (r) = Recorded By, (t) = Taken By, (c) = Cosigned By    Initials Name Provider Type    NARESH Pandya, RN Registered Nurse    TRINIDAD Ramey, RN Registered Nurse    TRINY ChavezW     RANDALL Greene, PT Physical Therapist          Physical Therapy Education     Title: PT OT SLP Therapies (Done)     Topic: Physical Therapy (Done)     Point: Mobility training (Done)    Learning Progress Summary    Learner Readiness Method Response Comment Documented by Status   Patient Acceptance [facility] 01/18/18 0918 Done               Point: Home exercise program (Done)    Learning Progress Summary    Learner Readiness Method Response Comment Documented by Status   Patient Acceptance [facility] 01/18/18 0918 Done               Point: Body mechanics (Done)    Learning Progress Summary    Learner Readiness Method Response Comment Documented by Status   Patient Acceptance E [facility] 01/18/18 0918 Done               Point: Precautions (Done)    Learning Progress Summary     Learner Readiness Method Response Comment Documented by Status   Patient Acceptance E [facility]  MA 01/18/18 0918 Done                      User Key     Initials Effective Dates Name Provider Type Discipline    MA 12/13/16 -  Elizabeth Greene, PT Physical Therapist PT                PT Recommendation and Plan  Anticipated Equipment Needs At Discharge: bedside commode (PT ordered this AM)  Anticipated Discharge Disposition: home with assist, home with home health  Planned Therapy Interventions: balance training, bed mobility training, gait training, home exercise program, patient/family education, postural re-education, ROM (Range of Motion), stair training, strengthening, transfer training  PT Frequency: 2 times/day  Plan of Care Review  Plan Of Care Reviewed With: patient  Outcome Summary/Follow up Plan: Patient is a pleasant 52 y.o. female s/p R TKA with expected post op weakness and impaired funct mobility. Patient is independent with all ADLs at baseline and does not use an AD. Assist x 1 required for all mobility. Ambulated 115' with RW. Patient will benefit from skilled PT services acutely to address deficits as able and improve level of independence. BSC ordered 1/18. Anticipate DC home tomorrow with  PT to follow up for continued rehabilitation.          IP PT Goals       01/18/18 0943          Transfer Training PT LTG    Transfer Training PT LTG, Date Established 01/18/18  -MA      Transfer Training PT LTG, Time to Achieve 1 wk  -MA      Transfer Training PT LTG, Activity Type all transfers  -MA      Transfer Training PT LTG, [place] Level supervision required  -MA      Transfer Training PT LTG, Assist Device walker, rolling  -MA      Gait Training PT LTG    Gait Training Goal PT LTG, Date Established 01/18/18  -MA      Gait Training Goal PT LTG, Time to Achieve 1 wk  -MA      Gait Training Goal PT LTG, [place] Level supervision required  -MA      Gait Training Goal PT LTG, Assist Device walker,  rolling  -MA      Gait Training Goal PT LTG, Distance to Achieve 150  -MA      Stair Training PT LTG    Stair Training Goal PT LTG, Date Established 01/18/18  -MA      Stair Training Goal PT LTG, Time to Achieve 1 wk  -MA      Stair Training Goal PT LTG, Number of Steps 3  -MA      Stair Training Goal PT LTG, [place] Level contact guard assist  -MA      Stair Training Goal PT LTG, Assist Device 1 handrail  -MA      Range of Motion PT LTG    Range of Motion Goal PT LTG, Date Established 01/18/18  -MA      Range of Motion Goal PT LTG, Time to Achieve 1 wk  -MA      Range fo Motion Goal PT LTG, Joint R knee  -MA      Range of Motion Goal PT LTG, AROM Measure 5-90'  -MA        User Key  (r) = Recorded By, (t) = Taken By, (c) = Cosigned By    Initials Name Provider Type    RANDALL Greene PT Physical Therapist                Outcome Measures       01/18/18 0900          How much help from another person do you currently need...    Turning from your back to your side while in flat bed without using bedrails? 4  -MA      Moving from lying on back to sitting on the side of a flat bed without bedrails? 4  -MA      Moving to and from a bed to a chair (including a wheelchair)? 3  -MA      Standing up from a chair using your arms (e.g., wheelchair, bedside chair)? 3  -MA      Climbing 3-5 steps with a railing? 3  -MA      To walk in hospital room? 3  -MA      AM-PAC 6 Clicks Score 20  -MA      Functional Assessment    Outcome Measure Options AM-PAC 6 Clicks Basic Mobility (PT)  -MA        User Key  (r) = Recorded By, (t) = Taken By, (c) = Cosigned By    Initials Name Provider Type    RANDALL Greene PT Physical Therapist           Time Calculation:         PT Charges       01/18/18 0946          Time Calculation    Start Time 0915  -MA      Stop Time 0940  -MA      Time Calculation (min) 25 min  -MA      PT Received On 01/18/18  -MA      PT - Next Appointment 01/18/18  -MA      PT Goal Re-Cert Due Date  01/25/18  -MA        User Key  (r) = Recorded By, (t) = Taken By, (c) = Cosigned By    Initials Name Provider Type    RANDALL Greene PT Physical Therapist          Therapy Charges for Today     Code Description Service Date Service Provider Modifiers Qty    46547036012 HC PT EVAL MOD COMPLEXITY 2 1/18/2018 Elizabeth Greene, PT GP 1    88102203101 HC PT THER PROC EA 15 MIN 1/18/2018 Elizabeth Greene, PT GP 1          PT G-Codes  Outcome Measure Options: AM-PAC 6 Clicks Basic Mobility (PT)      Elizabeth Greene, PT  1/18/2018

## 2018-01-18 NOTE — PROGRESS NOTES
Orthopedic Total Joint Progress Note        Patient: Makayla Boo    Date of Admission: 1/17/2018  8:04 AM    YOB: 1965    Medical Record Number: [id number]    Attending Physician: Arthur Higgins MD      POD # 1 Day Post-Op Procedure(s) (LRB):  RIGHT TOTAL KNEE ARTHROPLASTY WITH HERBERT NAVIGATION (Right)       Systemic or Specific Complaints: No Complaints      Allergies:   Allergies   Allergen Reactions   • Codeine Nausea And Vomiting   • Adhesive Tape Rash       Medications:   Current Medications:  Scheduled Meds:  docusate sodium 100 mg Oral BID   FLUoxetine 60 mg Oral QAM   losartan 100 mg Oral QAM   mupirocin  Each Nare BID   nicotine 1 patch Transdermal Q24H   polyethylene glycol 17 g Oral Daily   rivaroxaban 10 mg Oral Daily With Dinner     Continuous Infusions:  HYDROmorphone HCl-NaCl     lactated ringers 9 mL/hr Last Rate: 9 mL/hr (01/18/18 0738)   lactated ringers 100 mL/hr Last Rate: 100 mL/hr (01/17/18 2027)     PRN Meds:.•  acetaminophen  •  bisacodyl  •  bisacodyl  •  cyclobenzaprine  •  diphenhydrAMINE **OR** diphenhydrAMINE  •  gabapentin  •  magnesium hydroxide  •  naloxone  •  ondansetron **OR** ondansetron ODT **OR** ondansetron  •  oxyCODONE-acetaminophen  •  oxyCODONE-acetaminophen  •  promethazine      Physical Exam: 52 y.o. female Body mass index is 27.42 kg/(m^2).  General Appearance:    alert and oriented            Pain Relief: Patient reports good relief Vitals:    01/17/18 1900 01/17/18 2300 01/18/18 0731 01/18/18 0815   BP: 122/79 113/71 (!) 86/51  Comment: notified /62   BP Location: Right arm Right arm Right arm Left arm   Patient Position: Lying Lying Lying Lying   Pulse: 73 63 60    Resp: 16 16 16    Temp: 97.7 °F (36.5 °C) 97.6 °F (36.4 °C) 97 °F (36.1 °C)    TempSrc: Oral Oral Oral    SpO2: 95% 94% 94%    Weight:       Height:              HEENT:    Normocephalic, without obvious abnormality, atraumatic.          PERRLA; EOMI; Neck supple with trachea  midline. No JVD.       Lungs:     Respirations regular, even and unlabored      Heart:    Regular rhythm and normal rate.   Abdomen:     Normal bowel sounds, soft non-tender, non-distended       Extremities:   Operative extremity neurovascular status intact. ROM intact.    Incision intact w/out signs or symptoms of infection.  No cyanosis, no calf tenderness     Pulses:     Pulses palpable and equal bilaterally     Skin:     Skin Warm/Dry w/out cyanosis     Activity: Mobilizing Per P.T.   Weight Bearing: As Tolerated    Diagnostic Tests:   Admission on 01/17/2018   Component Date Value Ref Range Status   • Glucose 01/18/2018 113* 65 - 99 mg/dL Final   • BUN 01/18/2018 15  6 - 20 mg/dL Final   • Creatinine 01/18/2018 0.90  0.57 - 1.00 mg/dL Final   • Sodium 01/18/2018 133* 136 - 145 mmol/L Final   • Potassium 01/18/2018 4.0  3.5 - 5.2 mmol/L Final   • Chloride 01/18/2018 97* 98 - 107 mmol/L Final   • CO2 01/18/2018 21.7* 22.0 - 29.0 mmol/L Final   • Calcium 01/18/2018 8.9  8.6 - 10.5 mg/dL Final   • eGFR Non  Amer 01/18/2018 66  >60 mL/min/1.73 Final   • BUN/Creatinine Ratio 01/18/2018 16.7  7.0 - 25.0 Final   • Anion Gap 01/18/2018 14.3  mmol/L Final   • Hemoglobin 01/18/2018 11.1* 11.9 - 15.5 g/dL Final   • Hematocrit 01/18/2018 34.2* 35.6 - 45.5 % Final       Xr Knee 1 Or 2 View Right    Result Date: 1/17/2018  Narrative: PORTABLE JOINT X-RAY  HISTORY: Right knee pain, postop arthroplasty.  Portable x-ray of the right knee is provided.  FINDINGS:  There is arthroplasty hardware, positioned as expected.  No periprosthetic fracture is identified.  There are expected post operative changes in the soft tissues.      Impression: Right knee arthroplasty as expected..  This report was finalized on 1/17/2018 1:27 PM by Dr. Valentín Zhang MD.        Personally viewed ortho images and report     Assessment:  Doing well POD  # 1 Day Post-Op following total joint replacement  Acute Blood Loss Anemia,  stable  Post-operative Pain  Limited mobility, requires use of walker and assistance when OOB.    Patient Active Problem List   Diagnosis   • Chronic pain of right knee   • Arthritis of right knee   • Rheumatoid arthritis involving right knee with positive rheumatoid factor        Plan:  Continue to monitor labs and/or v/s, for tolerance to post op blood loss.  Continue efforts to increase mobilization.  Continue Pain Control Measures.  Continue incisional Care.  DVT prophylaxis.  Follow up in office with Arthur Higgins M.D. In 2 weeks.    Discharge Plan:POD 2-3 to home and home health    Date: 1/18/2018  ESTELA Hernandez  Seen today by Arthur Higgins M.D. and ESTELA Vu

## 2018-01-18 NOTE — PROGRESS NOTES
Continued Stay Note  [facility]     Patient Name: Makayla Boo  MRN: [id number]  Today's Date: 1/18/2018    Admit Date: 1/17/2018          Discharge Plan       01/18/18 1330    Case Management/Social Work Plan    Plan Home with HEENA PARKS    Patient/Family In Agreement With Plan yes    Additional Comments Called to Faina/HEENA PARKS to be sure she got referral and she did. She is following for discharge.               Discharge Codes     None        Expected Discharge Date and Time     Expected Discharge Date Expected Discharge Time    Jan 18, 2018             Dominick Rea RN

## 2018-01-18 NOTE — THERAPY TREATMENT NOTE
Acute Care - Physical Therapy Treatment Note  [facility]     Patient Name: Makayla Boo  : 1965  MRN: [id number]  Today's Date: 2018  Onset of Illness/Injury or Date of Surgery Date: 18 (s/p R TKA)  Date of Referral to PT: 18  Referring Physician: Dr. Higgins    Admit Date: 2018    Visit Dx:    ICD-10-CM ICD-9-CM   1. Status post total right knee replacement Z96.651 V43.65   2. Arthritis of right knee M17.11 716.96     Patient Active Problem List   Diagnosis   • Chronic pain of right knee   • Arthritis of right knee   • Rheumatoid arthritis involving right knee with positive rheumatoid factor               Adult Rehabilitation Note       18 1600          Rehab Assessment/Intervention    Discipline physical therapy assistant  -CW      Document Type therapy note (daily note)  -CW      Subjective Information agree to therapy;complains of;pain  -CW      Patient Effort, Rehab Treatment good  -CW      Precautions/Limitations fall precautions  -CW      Recorded by [CW] Isaac Sanchez PTA      Vital Signs    O2 Delivery Pre Treatment room air  -CW      Recorded by [CW] Isaac Sanchez PTA      Pain Assessment    Pain Assessment 0-10  -CW      Pain Score 9  -CW      Post Pain Score 9  -CW      Pain Type Surgical pain  -CW      Pain Location Knee  -CW      Pain Orientation Right  -CW      Pain Intervention(s) Repositioned;Ambulation/increased activity  -CW      Response to Interventions elías  -CW      Recorded by [CW] Isaac Sanchez PTA      Cognitive Assessment/Intervention    Current Cognitive/Communication Assessment functional  -CW      Orientation Status oriented x 4  -CW      Follows Commands/Answers Questions 100% of the time  -CW      Personal Safety WNL/WFL  -CW      Personal Safety Interventions fall prevention program maintained;gait belt;muscle strengthening facilitated;nonskid shoes/slippers when out of bed  -CW      Recorded by [CW] Isaac Sanchez PTA      ROM  (Range of Motion)    General ROM Detail 15-75  -CW      Recorded by [CW] Isaac Sanchez PTA      Bed Mobility, Assessment/Treatment    Bed Mobility, Scoot/Bridge, [place] not tested  -CW      Bed Mobility, Comment in chair  -CW      Recorded by [EVERETTE] Isaac Sanchez PTA      Transfer Assessment/Treatment    Transfers, Sit-Stand [place] supervision required  -CW      Transfers, Stand-Sit [place] supervision required  -CW      Transfers, Sit-Stand-Sit, Assist Device rolling walker  -CW      Recorded by [CW] Isaac Sanchez PTA      Gait Assessment/Treatment    Gait, [place] Level supervision required  -CW      Gait, Assistive Device rolling walker  -CW      Gait, Distance (Feet) 100  -CW      Gait, Gait Deviations right:;antalgic;waylon decreased;step length decreased;stride length decreased  -CW      Recorded by [EVERETTE] Isaac Sanchez PTA      Stairs Assessment/Treatment    Number of Stairs 4  -CW      Stairs, Handrail Location both sides  -CW      Stairs, [place] Level supervision required  -CW      Stairs, Technique Used step to step (ascending);step to step (descending)  -CW      Stairs, Impairments pain;ROM decreased  -CW      Recorded by [EVERETTE] Isaac Sanchez PTA      Therapy Exercises    Exercise Protocols total knee  -CW      Total Knee Exercises right:;15 reps;completed protocol  -CW      Recorded by [EVERETTE] Isaac Sanchez PTA      Positioning and Restraints    Pre-Treatment Position sitting in chair/recliner  -CW      Post Treatment Position chair  -CW      In Chair notified nsg;reclined;call light within reach;encouraged to call for assist  -CW      Recorded by [EVERETTE] Isaac Sanchez PTA        User Key  (r) = Recorded By, (t) = Taken By, (c) = Cosigned By    Initials Name Effective Dates    CW Isaac Sanchez PTA 12/13/16 -                 IP PT Goals       01/18/18 0943          Transfer Training PT LTG    Transfer Training PT LTG, Date Established  01/18/18  -MA      Transfer Training PT LTG, Time to Achieve 1 wk  -MA      Transfer Training PT LTG, Activity Type all transfers  -MA      Transfer Training PT LTG, [place] Level supervision required  -MA      Transfer Training PT LTG, Assist Device walker, rolling  -MA      Gait Training PT LTG    Gait Training Goal PT LTG, Date Established 01/18/18  -MA      Gait Training Goal PT LTG, Time to Achieve 1 wk  -MA      Gait Training Goal PT LTG, [place] Level supervision required  -MA      Gait Training Goal PT LTG, Assist Device walker, rolling  -MA      Gait Training Goal PT LTG, Distance to Achieve 150  -MA      Stair Training PT LTG    Stair Training Goal PT LTG, Date Established 01/18/18  -MA      Stair Training Goal PT LTG, Time to Achieve 1 wk  -MA      Stair Training Goal PT LTG, Number of Steps 3  -MA      Stair Training Goal PT LTG, [place] Level contact guard assist  -MA      Stair Training Goal PT LTG, Assist Device 1 handrail  -MA      Range of Motion PT LTG    Range of Motion Goal PT LTG, Date Established 01/18/18  -MA      Range of Motion Goal PT LTG, Time to Achieve 1 wk  -MA      Range fo Motion Goal PT LTG, Joint R knee  -MA      Range of Motion Goal PT LTG, AROM Measure 5-90'  -MA        User Key  (r) = Recorded By, (t) = Taken By, (c) = Cosigned By    Initials Name Provider Type    RANDALL Greene PT Physical Therapist          Physical Therapy Education     Title: PT OT SLP Therapies (Done)     Topic: Physical Therapy (Done)     Point: Mobility training (Done)    Learning Progress Summary    Learner Readiness Method Response Comment Documented by Status   Patient Acceptance E VU  MA 01/18/18 0918 Done               Point: Home exercise program (Done)    Learning Progress Summary    Learner Readiness Method Response Comment Documented by Status   Patient Acceptance E VU  MA 01/18/18 0918 Done               Point: Body mechanics (Done)    Learning Progress Summary    Learner  Readiness Method Response Comment Documented by Status   Patient Acceptance E [place]  MA 01/18/18 0918 Done               Point: Precautions (Done)    Learning Progress Summary    Learner Readiness Method Response Comment Documented by Status   Patient Acceptance E [facility] 01/18/18 0918 Done                      User Key     Initials Effective Dates Name Provider Type Discipline    MA 12/13/16 -  Elizabeth Greene PT Physical Therapist PT                    PT Recommendation and Plan  Anticipated Equipment Needs At Discharge: bedside commode (PT ordered this AM)  Anticipated Discharge Disposition: home with assist, home with home health  Planned Therapy Interventions: balance training, bed mobility training, gait training, home exercise program, patient/family education, postural re-education, ROM (Range of Motion), stair training, strengthening, transfer training  PT Frequency: 2 times/day             Outcome Measures       01/18/18 0900          How much help from another person do you currently need...    Turning from your back to your side while in flat bed without using bedrails? 4  -MA      Moving from lying on back to sitting on the side of a flat bed without bedrails? 4  -MA      Moving to and from a bed to a chair (including a wheelchair)? 3  -MA      Standing up from a chair using your arms (e.g., wheelchair, bedside chair)? 3  -MA      Climbing 3-5 steps with a railing? 3  -MA      To walk in hospital room? 3  -MA      AM-PAC 6 Clicks Score 20  -MA      Functional Assessment    Outcome Measure Options AM-PAC 6 Clicks Basic Mobility (PT)  -MA        User Key  (r) = Recorded By, (t) = Taken By, (c) = Cosigned By    Initials Name Provider Type    RANDALL Greene PT Physical Therapist           Time Calculation:         PT Charges       01/18/18 1631 01/18/18 0946       Time Calculation    Start Time 1412  -CW 0915  -MA     Stop Time 1445  -CW 0940  -MA     Time Calculation (min) 33 min  -CW 25 min  -MA      PT Received On 01/18/18  -CW 01/18/18  -MA     PT - Next Appointment 01/19/18  -CW 01/18/18  -MA     PT Goal Re-Cert Due Date  01/25/18  -MA       User Key  (r) = Recorded By, (t) = Taken By, (c) = Cosigned By    Initials Name Provider Type    RANDALL Greene, PT Physical Therapist    CW Isaac Sanchez, PTA Physical Therapy Assistant          Therapy Charges for Today     Code Description Service Date Service Provider Modifiers Qty    50040343571 HC PT THER PROC GROUP 1/18/2018 Isaac Sanchez PTA GP 1    40415470868 HC PT THER PROC EA 15 MIN 1/18/2018 Isaac Sanchez PTA GP 1          PT G-Codes  Outcome Measure Options: AM-PAC 6 Clicks Basic Mobility (PT)    Iasac Sanchez PTA  1/18/2018

## 2018-01-19 VITALS
HEART RATE: 74 BPM | SYSTOLIC BLOOD PRESSURE: 129 MMHG | BODY MASS INDEX: 26.89 KG/M2 | HEIGHT: 66 IN | TEMPERATURE: 98.2 F | DIASTOLIC BLOOD PRESSURE: 76 MMHG | RESPIRATION RATE: 18 BRPM | OXYGEN SATURATION: 95 % | WEIGHT: 167.3 LBS

## 2018-01-19 LAB
HCT VFR BLD AUTO: 35.2 % (ref 35.6–45.5)
HGB BLD-MCNC: 11.2 G/DL (ref 11.9–15.5)

## 2018-01-19 PROCEDURE — 85014 HEMATOCRIT: CPT | Performed by: ORTHOPAEDIC SURGERY

## 2018-01-19 PROCEDURE — 85018 HEMOGLOBIN: CPT | Performed by: ORTHOPAEDIC SURGERY

## 2018-01-19 PROCEDURE — 97110 THERAPEUTIC EXERCISES: CPT

## 2018-01-19 PROCEDURE — 99024 POSTOP FOLLOW-UP VISIT: CPT | Performed by: NURSE PRACTITIONER

## 2018-01-19 PROCEDURE — 97150 GROUP THERAPEUTIC PROCEDURES: CPT

## 2018-01-19 RX ADMIN — POLYETHYLENE GLYCOL 3350 17 G: 17 POWDER, FOR SOLUTION ORAL at 08:56

## 2018-01-19 RX ADMIN — MUPIROCIN: 20 OINTMENT TOPICAL at 08:56

## 2018-01-19 RX ADMIN — LOSARTAN POTASSIUM 100 MG: 100 TABLET ORAL at 08:57

## 2018-01-19 RX ADMIN — FLUOXETINE HYDROCHLORIDE 60 MG: 20 CAPSULE ORAL at 08:56

## 2018-01-19 RX ADMIN — OXYCODONE HYDROCHLORIDE AND ACETAMINOPHEN 2 TABLET: 7.5; 325 TABLET ORAL at 08:56

## 2018-01-19 RX ADMIN — CYCLOBENZAPRINE HYDROCHLORIDE 10 MG: 10 TABLET, FILM COATED ORAL at 05:19

## 2018-01-19 RX ADMIN — DOCUSATE SODIUM 100 MG: 100 CAPSULE, LIQUID FILLED ORAL at 08:56

## 2018-01-19 RX ADMIN — OXYCODONE HYDROCHLORIDE AND ACETAMINOPHEN 2 TABLET: 7.5; 325 TABLET ORAL at 05:10

## 2018-01-19 NOTE — THERAPY TREATMENT NOTE
Acute Care - Physical Therapy Treatment Note  [facility]     Patient Name: Makayla Boo  : 1965  MRN: [id number]  Today's Date: 2018  Onset of Illness/Injury or Date of Surgery Date: 18 (s/p R TKA)  Date of Referral to PT: 18  Referring Physician: Dr. Higgins    Admit Date: 2018    Visit Dx:    ICD-10-CM ICD-9-CM   1. Status post total right knee replacement Z96.651 V43.65   2. Arthritis of right knee M17.11 716.96     Patient Active Problem List   Diagnosis   • Chronic pain of right knee   • Arthritis of right knee   • Rheumatoid arthritis involving right knee with positive rheumatoid factor               Adult Rehabilitation Note       18 1100 18 1600       Rehab Assessment/Intervention    Discipline physical therapy assistant  -CW physical therapy assistant  -CW     Document Type therapy note (daily note)  -CW therapy note (daily note)  -CW     Subjective Information agree to therapy;complains of;pain  -CW agree to therapy;complains of;pain  -CW     Patient Effort, Rehab Treatment good  -CW good  -CW     Precautions/Limitations fall precautions  -CW fall precautions  -CW     Recorded by [CW] Isaac Sanchez PTA [CW] Isaac Sanchez PTA     Vital Signs    O2 Delivery Pre Treatment room air  -CW room air  -CW     Recorded by [CW] Isaac Sanchez PTA [CW] Isaac Sanchez PTA     Pain Assessment    Pain Assessment 0-10  -CW 0-10  -CW     Pain Score 7  -CW 9  -CW     Post Pain Score 7  -CW 9  -CW     Pain Type Surgical pain  -CW Surgical pain  -CW     Pain Location Knee  -CW Knee  -CW     Pain Orientation Right  -CW Right  -CW     Pain Intervention(s) Repositioned;Ambulation/increased activity  -CW Repositioned;Ambulation/increased activity  -CW     Response to Interventions elías  -CW elías  -CW     Recorded by [CW] Isaac Sanchez PTA [CW] Isaac Sanchez PTA     Cognitive Assessment/Intervention    Current Cognitive/Communication Assessment functional  -CW  functional  -CW     Orientation Status oriented x 4  -CW oriented x 4  -CW     Follows Commands/Answers Questions 100% of the time  -% of the time  -CW     Personal Safety WNL/WFL  -CW WNL/WFL  -CW     Personal Safety Interventions fall prevention program maintained;gait belt;muscle strengthening facilitated;nonskid shoes/slippers when out of bed  -CW fall prevention program maintained;gait belt;muscle strengthening facilitated;nonskid shoes/slippers when out of bed  -CW     Recorded by [CW] Isaac Sanchez PTA [CW] Isaac Sanchez PTA     ROM (Range of Motion)    General ROM Detail 20-85  -CW 15-75  -CW     Recorded by [CW] Isaac Sanchez PTA [CW] Isaac Sanchez PTA     Bed Mobility, Assessment/Treatment    Bed Mobility, Scoot/Bridge, [place] not tested  -CW not tested  -CW     Bed Mobility, Comment  in chair  -CW     Recorded by [CW] Isaac Sanchez PTA [CW] Isaac Sanchez PTA     Transfer Assessment/Treatment    Transfers, Sit-Stand [place] supervision required  -CW supervision required  -CW     Transfers, Stand-Sit [place] supervision required  -CW supervision required  -CW     Transfers, Sit-Stand-Sit, Assist Device rolling walker  -CW rolling walker  -CW     Recorded by [CW] Isaac Sanchez PTA [CW] Isaac Sanchez PTA     Gait Assessment/Treatment    Gait, [place] Level supervision required  -CW supervision required  -CW     Gait, Assistive Device rolling walker  -CW rolling walker  -CW     Gait, Distance (Feet) 100  -  -CW     Gait, Gait Deviations right:;antalgic;waylon decreased;step length decreased;stride length decreased  -CW right:;antalgic;waylon decreased;step length decreased;stride length decreased  -CW     Recorded by [CW] Isaac Sanchez PTA [CW] Isaac Sanchez PTA     Stairs Assessment/Treatment    Number of Stairs  4  -CW     Stairs, Handrail Location  both sides  -CW     Stairs, [place] Level  supervision  required  -CW     Stairs, Technique Used  step to step (ascending);step to step (descending)  -CW     Stairs, Impairments pain;ROM decreased  -CW pain;ROM decreased  -CW     Recorded by [CW] Isaac Sanchez PTA [CW] Isaac Sanchez PTA     Therapy Exercises    Exercise Protocols total knee  -CW total knee  -CW     Total Knee Exercises right:;20 reps;completed protocol  -CW right:;15 reps;completed protocol  -CW     Recorded by [CW] Isaac Sanchez PTA [CW] Isaac Sanchez PTA     Positioning and Restraints    Pre-Treatment Position sitting in chair/recliner  -CW sitting in chair/recliner  -CW     Post Treatment Position chair  -CW chair  -CW     In Chair notified nsg;reclined;call light within reach;encouraged to call for assist  -CW notified nsg;reclined;call light within reach;encouraged to call for assist  -CW     Recorded by [CW] Isaac Sanchez PTA [CW] Isaac Sanchez PTA       User Key  (r) = Recorded By, (t) = Taken By, (c) = Cosigned By    Initials Name Effective Dates     Isaac Sanchez PTA 12/13/16 -                 IP PT Goals       01/18/18 0943          Transfer Training PT LTG    Transfer Training PT LTG, Date Established 01/18/18  -MA      Transfer Training PT LTG, Time to Achieve 1 wk  -MA      Transfer Training PT LTG, Activity Type all transfers  -MA      Transfer Training PT LTG, [place] Level supervision required  -MA      Transfer Training PT LTG, Assist Device walker, rolling  -MA      Gait Training PT LTG    Gait Training Goal PT LTG, Date Established 01/18/18  -MA      Gait Training Goal PT LTG, Time to Achieve 1 wk  -MA      Gait Training Goal PT LTG, [place] Level supervision required  -MA      Gait Training Goal PT LTG, Assist Device walker, rolling  -MA      Gait Training Goal PT LTG, Distance to Achieve 150  -MA      Stair Training PT LTG    Stair Training Goal PT LTG, Date Established 01/18/18  -MA      Stair Training Goal PT LTG, Time to  Achieve 1 wk  -MA      Stair Training Goal PT LTG, Number of Steps 3  -MA      Stair Training Goal PT LTG, [place] Level contact guard assist  -MA      Stair Training Goal PT LTG, Assist Device 1 handrail  -MA      Range of Motion PT LTG    Range of Motion Goal PT LTG, Date Established 01/18/18  -MA      Range of Motion Goal PT LTG, Time to Achieve 1 wk  -MA      Range fo Motion Goal PT LTG, Joint R knee  -MA      Range of Motion Goal PT LTG, AROM Measure 5-90'  -MA        User Key  (r) = Recorded By, (t) = Taken By, (c) = Cosigned By    Initials Name Provider Type    RANDALL Greene PT Physical Therapist          Physical Therapy Education     Title: PT OT SLP Therapies (Resolved)     Topic: Physical Therapy (Resolved)     Point: Mobility training (Resolved)    Learning Progress Summary    Learner Readiness Method Response Comment Documented by Status   Patient Acceptance E [facility] 01/18/18 0918 Done               Point: Home exercise program (Resolved)    Learning Progress Summary    Learner Readiness Method Response Comment Documented by Status   Patient Acceptance E [facility] 01/18/18 0918 Done               Point: Body mechanics (Resolved)    Learning Progress Summary    Learner Readiness Method Response Comment Documented by Status   Patient Acceptance E [facility] 01/18/18 0918 Done               Point: Precautions (Resolved)    Learning Progress Summary    Learner Readiness Method Response Comment Documented by Status   Patient Acceptance E [facility] 01/18/18 0918 Done                      User Key     Initials Effective Dates Name Provider Type Discipline    MA 12/13/16 -  Elizabeth Greene PT Physical Therapist PT                    PT Recommendation and Plan  Anticipated Equipment Needs At Discharge: bedside commode (PT ordered this AM)  Anticipated Discharge Disposition: home with assist, home with home health  Planned Therapy Interventions: balance training, bed mobility training, gait training, home  exercise program, patient/family education, postural re-education, ROM (Range of Motion), stair training, strengthening, transfer training  PT Frequency: 2 times/day             Outcome Measures       01/18/18 0900          How much help from another person do you currently need...    Turning from your back to your side while in flat bed without using bedrails? 4  -MA      Moving from lying on back to sitting on the side of a flat bed without bedrails? 4  -MA      Moving to and from a bed to a chair (including a wheelchair)? 3  -MA      Standing up from a chair using your arms (e.g., wheelchair, bedside chair)? 3  -MA      Climbing 3-5 steps with a railing? 3  -MA      To walk in hospital room? 3  -MA      AM-PAC 6 Clicks Score 20  -MA      Functional Assessment    Outcome Measure Options AM-PAC 6 Clicks Basic Mobility (PT)  -MA        User Key  (r) = Recorded By, (t) = Taken By, (c) = Cosigned By    Initials Name Provider Type    RANDALL Greene, PT Physical Therapist           Time Calculation:         PT Charges       01/19/18 1146          Time Calculation    Start Time 0930  -CW      Stop Time 1016  -CW      Time Calculation (min) 46 min  -CW      PT Received On 01/19/18  -CW        User Key  (r) = Recorded By, (t) = Taken By, (c) = Cosigned By    Initials Name Provider Type    CW Isaac Sanchez PTA Physical Therapy Assistant          Therapy Charges for Today     Code Description Service Date Service Provider Modifiers Qty    08164680363 HC PT THER PROC GROUP 1/18/2018 Isaac Sanchez PTA GP 1    18160347890 HC PT THER PROC EA 15 MIN 1/18/2018 Isaac Sanchez PTA GP 1    35532369649 HC PT THER PROC GROUP 1/19/2018 Isaac Sanchez PTA GP 1    90615542642 HC PT THER PROC EA 15 MIN 1/19/2018 Isaac Sanchez PTA GP 1          PT G-Codes  Outcome Measure Options: AM-PAC 6 Clicks Basic Mobility (PT)    Isaac Sanchez PTA  1/19/2018

## 2018-01-19 NOTE — DISCHARGE SUMMARY
Orthopedic Discharge Summary      Patient: Makayla Boo  YOB: 1965  Medical Record Number: [id number]    Attending Physician: Arthur Higgins MD  Date of Admission: 1/17/2018  8:04 AM  Date of Discharge: 1/19/18    Discharge Diagnosis: RIGHT TOTAL KNEE ARTHROPLASTY WITH HERBERT NAVIGATION,   Acute Blood Loss Anemia, stable  Post-operative Pain  Limited mobility, requires use of walker and assistance when OOB.    Presenting Problem/History of Present Illness: Arthritis of right knee [M17.11]  Chronic pain of right knee [M25.561, G89.29]        Allergies:   Allergies   Allergen Reactions   • Codeine Nausea And Vomiting   • Adhesive Tape Rash       Discharge Medications     Makayla Boo   Home Medication Instructions LEEANNE:[id number]    Printed on:01/19/18 0905   Medication Information                      bisacodyl (DULCOLAX) 5 MG EC tablet  Take 2 tablets by mouth Daily As Needed for Constipation.             cyclobenzaprine (FLEXERIL) 10 MG tablet  Take 10 mg by mouth 3 (Three) Times a Day As Needed for Muscle Spasms.             docusate sodium 100 MG capsule  Take 100 mg by mouth 2 (Two) Times a Day.             FLUoxetine (PROzac) 20 MG capsule  Take 60 mg by mouth Every Morning.             gabapentin (NEURONTIN) 400 MG capsule  Take 400 mg by mouth 3 (Three) Times a Day As Needed.             losartan (COZAAR) 100 MG tablet  Take 100 mg by mouth Every Morning.             ondansetron ODT (ZOFRAN-ODT) 4 MG disintegrating tablet  Take 1 tablet by mouth Every 6 (Six) Hours As Needed for Nausea or Vomiting.             oxyCODONE-acetaminophen (PERCOCET) 7.5-325 MG per tablet  1-2 tabs po q 3-4 hr prn severe pain, wean as tolerated             polyethylene glycol (MIRALAX) pack packet  Take 17 g by mouth Daily.             rivaroxaban (XARELTO) 10 MG tablet  Take 1 tablet by mouth Daily With Dinner for 14 doses.                   Past Medical History:   Diagnosis Date   • Anxiety    • Arthritis     RA   •  COPD (chronic obstructive pulmonary disease)    • Depression    • Heart murmur    • Hepatitis C carrier    • History of MRSA infection 2016    right wrist   • History of sepsis     RT HAND/WIRST 2016   • History of transfusion    • Hypertension    • Knee pain         Past Surgical History:   Procedure Laterality Date   • BUNIONECTOMY Bilateral    • CARPAL TUNNEL RELEASE Bilateral    • CERVICAL FUSION     • INCISION AND DRAINAGE ABSCESS      RT HAND/WRIST X1   • KNEE ARTHROSCOPY     • KNEE ARTHROSCOPY W/ ACL RECONSTRUCTION Right    • ORIF FINGER / THUMB FRACTURE Right    • TOTAL KNEE ARTHROPLASTY Right 1/17/2018    Procedure: RIGHT TOTAL KNEE ARTHROPLASTY WITH HERBERT NAVIGATION;  Surgeon: Arthur Higgins MD;  Location: [facility] OR;  Service:         Social History     Occupational History   • Not on file.     Social History Main Topics   • Smoking status: Current Every Day Smoker     Packs/day: 0.50     Years: 30.00     Types: Cigarettes   • Smokeless tobacco: Never Used      Comment: last cigarette 1/17/18 0530   • Alcohol use Yes      Comment: social   • Drug use: Yes     Special: Marijuana      Comment: last use 1/17/18 - daily use.    • Sexual activity: Defer    Social History     Social History Narrative        Family History   Problem Relation Age of Onset   • Malig Hyperthermia Neg Hx          Physical Exam: 52 y.o. female Body mass index is 27.42 kg/(m^2).    General Appearance:    Alert, cooperative, in no acute distress                    Vitals:    01/18/18 1900 01/18/18 2300 01/19/18 0300 01/19/18 0730   BP: 94/54 156/82 156/92 153/83   BP Location: Left arm Left arm Left arm Left arm   Patient Position: Lying Lying Lying Lying   Pulse: 70 76 91 69   Resp: 14 16 18 18   Temp: 98.7 °F (37.1 °C) 97 °F (36.1 °C) 98.3 °F (36.8 °C) 98.8 °F (37.1 °C)   TempSrc: Oral Oral Oral Oral   SpO2: 93% 95% 93% 94%   Weight:       Height:            DIAGNOSTIC TESTS:   Admission on 01/17/2018   Component Date Value  Ref Range Status   • Glucose 01/18/2018 113* 65 - 99 mg/dL Final   • BUN 01/18/2018 15  6 - 20 mg/dL Final   • Creatinine 01/18/2018 0.90  0.57 - 1.00 mg/dL Final   • Sodium 01/18/2018 133* 136 - 145 mmol/L Final   • Potassium 01/18/2018 4.0  3.5 - 5.2 mmol/L Final   • Chloride 01/18/2018 97* 98 - 107 mmol/L Final   • CO2 01/18/2018 21.7* 22.0 - 29.0 mmol/L Final   • Calcium 01/18/2018 8.9  8.6 - 10.5 mg/dL Final   • eGFR Non  Amer 01/18/2018 66  >60 mL/min/1.73 Final   • BUN/Creatinine Ratio 01/18/2018 16.7  7.0 - 25.0 Final   • Anion Gap 01/18/2018 14.3  mmol/L Final   • Hemoglobin 01/18/2018 11.1* 11.9 - 15.5 g/dL Final   • Hematocrit 01/18/2018 34.2* 35.6 - 45.5 % Final   • Hemoglobin 01/19/2018 11.2* 11.9 - 15.5 g/dL Final   • Hematocrit 01/19/2018 35.2* 35.6 - 45.5 % Final       Xr Knee 1 Or 2 View Right    Result Date: 1/17/2018  Narrative: PORTABLE JOINT X-RAY  HISTORY: Right knee pain, postop arthroplasty.  Portable x-ray of the right knee is provided.  FINDINGS:  There is arthroplasty hardware, positioned as expected.  No periprosthetic fracture is identified.  There are expected post operative changes in the soft tissues.      Impression: Right knee arthroplasty as expected..  This report was finalized on 1/17/2018 1:27 PM by Dr. Valentín Zhagn MD.        Hospital Course:  52 y.o. female admitted to [facility] to services of Arthur Higgins MD with Arthritis of right knee [M17.11]  Chronic pain of right knee [M25.561, G89.29] on 1/17/2018 and underwent RIGHT TOTAL KNEE ARTHROPLASTY WITH HERBERT NAVIGATION  Per Arthur Hgigins MD. Antibiotic and VTE prophylaxis were per SCIP protocols. Post-operatively the patient transferred to the post-operative floor where the patient underwent mobilization therapy that included active as well as passive ROM exercises. Opioids were titrated to achieve appropriate pain management to allow for participation in mobilization exercises. Vital signs are now  stable. On the day of discharge the wound was clean, dry and intact and calf was soft and non tender and Homans sign was negative. Operative extremity neurovascular status remains intact.   Appropriate education re: incision care, activity levels, medications, and follow up visits was completed and all questions were answered. The patient is now deemed stable for discharge.    Condition on Discharge:  Stable    Discharge Instructions: Patient is to continue with physical therapy exercises twice daily and continue working with the physical therapist as ordered. Patient may weight bear as tolerated unless otherwise specified. Continue HALLEY hose daily (for six weeks) and ice regularly. Patient instructed on frequent calf pumping exercises.  Patient also instructed on incentive spirometer during hospitalization and encouraged to continue to use at home regularly.  See wound care discharge instructions.    Follow up Instructions:  Follow up in the office with Dr. Arthur Haddad in 2 weeks - patient to call the office at [phone] to schedule. Prescriptions were given for pain medication and blood thinner therapy.    Follow-up Appointments  Future Appointments  Date Time Provider Department Center   2/1/2018 10:20 AM Arthur Haddad MD [facility] None     Additional Instructions for the Follow-ups that You Need to Schedule     Ambulatory Referral to Home Health    As directed    Face to Face Visit Date:  1/17/2018    Follow-up Provider for Plan of Care?:  I will be treating the patient on an ongoing basis.  Please send me the Plan of Care for signature.    Follow-up Provider:  ARTHUR HADDAD [[id number]]    Reason/Clinical Findings:  post operative pain with ambulation, requires use of walker for ambulation    Describe mobility limitations that make leaving home difficult:  requires assist of another to leave home.    Nursing/Therapeutic Services Requested:  Skilled Nursing Physical Therapy    Skilled nursing orders:  Wound  care dressing/changes    PT orders:  Total joint pathway    Frequency:  1 Week 1           Discharge Follow-up with Specified Provider: Arthur Higgins M.D. at [facility] [phone]; 2 Weeks    As directed    To:  Arthur Higgins M.D. at [facility] [phone]    Follow Up:  2 Weeks           Dressing Change Instructions    As directed    IV. INCISION CARE: May shower and let water run over the incision on post-operative day #5. Do not scrub or rub the incision. Simply let the water run over the incision and pat dry. Keep covered with clean dry dressing as long as there is drainage.    Wash your hands prior to dressing changes  Change the dressing daily as needed to keep incision clean and dry. Utilize dry gauze and paper tape. Avoid touching the side of the gauze that goes against the incision with your hands.  No creams or ointments to the incision  May remove dressing once the incision is free of drainage usually around 5 days post op.  Do not touch or pick at the incision  Check incision every day and notify surgeon immediately if any of the following signs or symptoms are noted:  Increase in baseline redness (bruising is normal)  Increase in baseline swelling around the incision and of the entire extremity  Sudden increase in pain or inability to bend the knee  Drainage oozing from the incision more than 5 days out from surgery.  Pulling apart of the edges of the incision  Increase in overall body temperature (greater than 100.5 degrees) Make sure you are doing your incentive spirometer and deep breathing exercises every day while awake as this is the most frequent cause of low grade fever post operatively.  Your staple removal will be done in the office at your follow-up visit.   Order Comments:  IV. INCISION CARE: May shower and let water run over the incision on post-operative day #5. Do not scrub or rub the incision. Simply let the water run  over the incision and pat dry. Keep covered with clean dry dressing as long as there is drainage. Wash your hands prior to dressing changes Change the dressing daily as needed to keep incision clean and dry. Utilize dry gauze and paper tape. Avoid touching the side of the gauze that goes against the incision with your hands. No creams or ointments to the incision May remove dressing once the incision is free of drainage usually around 5 days post op. Do not touch or pick at the incision Check incision every day and notify surgeon immediately if any of the following signs or symptoms are noted: Increase in baseline redness (bruising is normal) Increase in baseline swelling around the incision and of the entire extremity Sudden increase in pain or inability to bend the knee Drainage oozing from the incision more than 5 days out from surgery. Pulling apart of the edges of the incision Increase in overall body temperature (greater than 100.5 degrees) Make sure you are doing your incentive spirometer and deep breathing exercises every day while awake as this is the most frequent cause of low grade fever post operatively. Your staple removal will be done in the office at your follow-up visit.                      Discharge Disposition Plan:today to home and home health    Date: 1/19/2018    Arthur Higgins MD

## 2018-01-19 NOTE — PROGRESS NOTES
Case Management Discharge Note    Final Note: Home with [facility]    Discharge Placement     Facility/Agency Request Status Selected? Address Phone Number Fax Number    [facility] Accepted    Yes [address] [place] [id number] [phone]        Payton Boyd LCSW 1/17/2018 17:24    Referral to [facility]                   Other: Other (Per car)    Discharge Codes: 06  Discharged/transferred to home under care of organized home health service organization in anticipation of skilled care

## 2018-01-19 NOTE — PLAN OF CARE
Problem: Patient Care Overview (Adult)  Goal: Plan of Care Review  Outcome: Ongoing (interventions implemented as appropriate)   01/19/18 0331   Coping/Psychosocial Response Interventions   Plan Of Care Reviewed With patient   Patient Care Overview   Progress improving   Outcome Evaluation   Outcome Summary/Follow up Plan VSS, DSG C/D/I, NEUROVASCULAR STATUS WNL, VOIDING WELL, REPORTS ADEQUATE PAIN CONTROL, EDUCATED ON MONITOIRNG B/P D/T HX HYPERTENSION     Goal: Adult Individualization and Mutuality  Outcome: Ongoing (interventions implemented as appropriate)    Goal: Discharge Needs Assessment  Outcome: Ongoing (interventions implemented as appropriate)      Problem: Perioperative Period (Adult)  Goal: Signs and Symptoms of Listed Potential Problems Will be Absent or Manageable (Perioperative Period)  Outcome: Outcome(s) achieved Date Met: 01/19/18      Problem: Knee Replacement, Total (Adult)  Goal: Signs and Symptoms of Listed Potential Problems Will be Absent or Manageable (Knee Replacement, Total)  Outcome: Ongoing (interventions implemented as appropriate)      Problem: Fall Risk (Adult)  Goal: Absence of Falls  Outcome: Ongoing (interventions implemented as appropriate)

## 2018-01-19 NOTE — PLAN OF CARE
Problem: Patient Care Overview (Adult)  Goal: Plan of Care Review  Outcome: Ongoing (interventions implemented as appropriate)   01/19/18 1115   Coping/Psychosocial Response Interventions   Plan Of Care Reviewed With patient;family   Patient Care Overview   Progress improving   Outcome Evaluation   Outcome Summary/Follow up Plan VSS. BP stable on home BP medication regimen. education provided on home BP monitoring at discharge. right knee dressing CDI. PT gym session x 1. voiding per BRP without difficulty. voices adequate pain control with PO Percocet. discharge home with HH and family support.        Problem: Knee Replacement, Total (Adult)  Goal: Signs and Symptoms of Listed Potential Problems Will be Absent or Manageable (Knee Replacement, Total)  Outcome: Ongoing (interventions implemented as appropriate)      Problem: Fall Risk (Adult)  Goal: Absence of Falls  Outcome: Ongoing (interventions implemented as appropriate)

## 2018-01-19 NOTE — PROGRESS NOTES
Orthopedic Total Joint Progress Note        Patient: Makayla Boo    Date of Admission: 1/17/2018  8:04 AM    YOB: 1965    Medical Record Number: [id number]    Attending Physician: Arthur Higgins MD      POD # 2 Days Post-Op Procedure(s) (LRB):  RIGHT TOTAL KNEE ARTHROPLASTY WITH HERBERT NAVIGATION (Right)       Systemic or Specific Complaints: No Complaints      Allergies:   Allergies   Allergen Reactions   • Codeine Nausea And Vomiting   • Adhesive Tape Rash       Medications:   Current Medications:  Scheduled Meds:  docusate sodium 100 mg Oral BID   FLUoxetine 60 mg Oral QAM   losartan 100 mg Oral QAM   nicotine 1 patch Transdermal Q24H   polyethylene glycol 17 g Oral Daily   rivaroxaban 10 mg Oral Daily With Dinner     Continuous Infusions:  HYDROmorphone HCl-NaCl  Last Rate: Stopped (01/18/18 1132)   lactated ringers 9 mL/hr Last Rate: Stopped (01/18/18 1204)     PRN Meds:.•  acetaminophen  •  bisacodyl  •  bisacodyl  •  cyclobenzaprine  •  diphenhydrAMINE **OR** diphenhydrAMINE  •  gabapentin  •  magnesium hydroxide  •  naloxone  •  ondansetron **OR** ondansetron ODT **OR** ondansetron  •  oxyCODONE-acetaminophen  •  oxyCODONE-acetaminophen  •  promethazine      Physical Exam: 52 y.o. female Body mass index is 27.42 kg/(m^2).  General Appearance:    alert and oriented            Pain Relief: Patient reports good relief Vitals:    01/18/18 1900 01/18/18 2300 01/19/18 0300 01/19/18 0730   BP: 94/54 156/82 156/92 153/83   BP Location: Left arm Left arm Left arm Left arm   Patient Position: Lying Lying Lying Lying   Pulse: 70 76 91 69   Resp: 14 16 18 18   Temp: 98.7 °F (37.1 °C) 97 °F (36.1 °C) 98.3 °F (36.8 °C) 98.8 °F (37.1 °C)   TempSrc: Oral Oral Oral Oral   SpO2: 93% 95% 93% 94%   Weight:       Height:              HEENT:    Normocephalic, without obvious abnormality, atraumatic.          PERRLA; EOMI; Neck supple with trachea midline. No JVD.       Lungs:     Respirations regular, even and  unlabored      Heart:    Regular rhythm and normal rate.   Abdomen:     Normal bowel sounds, soft non-tender, non-distended       Extremities:   Operative extremity neurovascular status intact. ROM intact.    Incision intact w/out signs or symptoms of infection.  No cyanosis, no calf tenderness     Pulses:     Pulses palpable and equal bilaterally     Skin:     Skin Warm/Dry w/out cyanosis     Activity: Mobilizing Per P.T.   Weight Bearing: As Tolerated    Diagnostic Tests:   Admission on 01/17/2018   Component Date Value Ref Range Status   • Glucose 01/18/2018 113* 65 - 99 mg/dL Final   • BUN 01/18/2018 15  6 - 20 mg/dL Final   • Creatinine 01/18/2018 0.90  0.57 - 1.00 mg/dL Final   • Sodium 01/18/2018 133* 136 - 145 mmol/L Final   • Potassium 01/18/2018 4.0  3.5 - 5.2 mmol/L Final   • Chloride 01/18/2018 97* 98 - 107 mmol/L Final   • CO2 01/18/2018 21.7* 22.0 - 29.0 mmol/L Final   • Calcium 01/18/2018 8.9  8.6 - 10.5 mg/dL Final   • eGFR Non  Amer 01/18/2018 66  >60 mL/min/1.73 Final   • BUN/Creatinine Ratio 01/18/2018 16.7  7.0 - 25.0 Final   • Anion Gap 01/18/2018 14.3  mmol/L Final   • Hemoglobin 01/18/2018 11.1* 11.9 - 15.5 g/dL Final   • Hematocrit 01/18/2018 34.2* 35.6 - 45.5 % Final   • Hemoglobin 01/19/2018 11.2* 11.9 - 15.5 g/dL Final   • Hematocrit 01/19/2018 35.2* 35.6 - 45.5 % Final       Xr Knee 1 Or 2 View Right    Result Date: 1/17/2018  Narrative: PORTABLE JOINT X-RAY  HISTORY: Right knee pain, postop arthroplasty.  Portable x-ray of the right knee is provided.  FINDINGS:  There is arthroplasty hardware, positioned as expected.  No periprosthetic fracture is identified.  There are expected post operative changes in the soft tissues.      Impression: Right knee arthroplasty as expected..  This report was finalized on 1/17/2018 1:27 PM by Dr. Valentín Zhang MD.        Personally viewed previously     Assessment:  Doing well POD  # 2 Days Post-Op following total joint replacement  Acute Blood  Loss Anemia, stable  Post-operative Pain  Limited mobility, requires use of walker and assistance when OOB.    Patient Active Problem List   Diagnosis   • Chronic pain of right knee   • Arthritis of right knee   • Rheumatoid arthritis involving right knee with positive rheumatoid factor        Plan:  Continue to monitor labs and/or v/s, for tolerance to post op blood loss.  Continue efforts to increase mobilization.  Continue Pain Control Measures.  Continue incisional Care.  DVT prophylaxis.  Follow up in office with Arthur Higgins M.D. In 2 weeks.    Discharge Plan:today to home and home health    Date: 1/19/2018  Helena Hobson, APRN

## 2018-01-19 NOTE — PROGRESS NOTES
Continued Stay Note  [facility]     Patient Name: Makayla Boo  MRN: [id number]  Today's Date: 1/19/2018    Admit Date: 1/17/2018          Discharge Plan       01/19/18 0929    Case Management/Social Work Plan    Plan Home with [facility]    Patient/Family In Agreement With Plan yes    Additional Comments Noted plans for discharge. Called to [facility]/HE[facility] PARKS to inform of DC today.               Discharge Codes     None        Expected Discharge Date and Time     Expected Discharge Date Expected Discharge Time    Jan 19, 2018             Dominick Rea RN

## 2018-01-22 ENCOUNTER — TELEPHONE (OUTPATIENT)
Dept: ORTHOPEDIC SURGERY | Facility: CLINIC | Age: 53
End: 2018-01-22

## 2018-01-22 NOTE — TELEPHONE ENCOUNTER
Patient called stating she only got her pain meds filled after surgery and the pharmacy has no record of other scripts. She says she does not need the stool softner/laxative or the nausea medicine. She never started on the blood thinner yet (Xarelto). [facility] pharmacy [phone]    **Patient says she did not call before today because she got discharged on Friday afternoon and thought would be at pharmacy by Friday evening. Then the weekend came so she did not call.

## 2018-02-01 ENCOUNTER — OFFICE VISIT (OUTPATIENT)
Dept: ORTHOPEDIC SURGERY | Facility: CLINIC | Age: 53
End: 2018-02-01

## 2018-02-01 VITALS — BODY MASS INDEX: 25.71 KG/M2 | HEIGHT: 66 IN | WEIGHT: 160 LBS | TEMPERATURE: 97.4 F

## 2018-02-01 DIAGNOSIS — Z96.651 STATUS POST TOTAL RIGHT KNEE REPLACEMENT: Primary | ICD-10-CM

## 2018-02-01 PROCEDURE — 99024 POSTOP FOLLOW-UP VISIT: CPT | Performed by: NURSE PRACTITIONER

## 2018-02-01 PROCEDURE — 73560 X-RAY EXAM OF KNEE 1 OR 2: CPT | Performed by: NURSE PRACTITIONER

## 2018-02-01 RX ORDER — OXYCODONE AND ACETAMINOPHEN 7.5; 325 MG/1; MG/1
TABLET ORAL
Qty: 100 TABLET | Refills: 0 | Status: SHIPPED | OUTPATIENT
Start: 2018-02-01 | End: 2018-03-02

## 2018-02-01 NOTE — PROGRESS NOTES
Makayla Boo : 1965 MRN: [id number] DATE: 2018  Body mass index is 25.82 kg/(m^2).  Vitals:    18 1111   Temp: 97.4 °F (36.3 °C)       DIAGNOSIS: 2 week follow up right total knee arthroplasty    SUBJECTIVE:Patient returns today for 2 week follow up of right total knee replacement. Patient reports doing well with no unusual complaints. Appears to be progressing appropriately.    OBJECTIVE:   Exam:. The incision is healing appropriately. No sign of infection. Range of motion is progressing as expected -. The calf is soft and nontender with a negative Homans sign.    DIAGNOSTIC STUDIES  Xrays: 2 views of the right knee (AP full length and lateral) were ordered and images were reviewed for evaluation of recent knee replacement. They demonstrate a well positioned, well aligned knee replacement without complicating factors noted. In comparison with previous films there has been interval implant placement.    ASSESSMENT: 2 week status post right total knee replacement expected healing.    PLAN: 1) Staples removed and steri strips applied   2) Order given for PT and pain medicine (as needed)   3) Continue HALLEY hose for four weeks   4) Continue ice PRN   5) Total Knee:  Discontinue Xarelto and begin EC Aspirin 325 mg po once a day until 6 weeks post op.   6) Follow up in 4 weeks with repeat Xrays of right knee (2 views)   7) Continue with antibiotic prophylaxis with dental procedures for 2 yrs post total joint replacement.    Helena Hobson, APRN  2018     Pain Medications utilized after surgery are narcotics and the law requires that the following information be given to all patients that are prescribed narcotics:    CLASSIFICATION: Pain medications are called Opioids and are narcotics  LEGALITIES: It is illegal to share narcotics with others and to drive within 4 hours of taking narcotics  POTENTIAL SIDE EFFECTS: Potential side effects of opioids include: nausea, vomiting, itching, dizziness,  drowsiness, dry mouth, constipation, and difficulty urinating.  POTENTIAL ADVERSE EFFECTS:   Opioid tolerance can develop with use of pain medications and this simply means that it requires more and more of the medication to control pain; however, this is seen more in patients that use opioids for longer periods of time.  Opioid dependence can develop with use of Opioids and this simply means that to stop the medication can cause withdrawal symptoms so wean gradually (do not stop all at once); however, this is seen more with patients that use opioids for longer periods of time.  Opioid addiction can develop with use of Opioids and the incidence of this is very unlikely in patients who take the medications as ordered and stop the medications as instructed.  Opioid overdose can be dangerous, but is unlikely when the medication is taken as ordered and stopped when ordered. It is important not to mix opioids with alcohol or with and type of sedative such as Benadryl as this can lead to over sedation and respiratory difficulty.    DOSAGE:   Pain medications will need to be taken consistently for the first week to decrease pain and promote adequate pain relief and participation in physical therapy.    After the initial surgical pain begins to resolve, you may begin to decrease the pain medication. By the end of 8 weeks, you should be off of pain medications.    Refills will not be given by the office during evening hours, on weekends.  To seek refills on pain medications during the initial 6 week post-operative period, you must call the office 48 hours in advance to request the refill. The office will then notify you when to  the prescription. DO NOT wait until you are out of the medication to request a refill.

## 2018-02-01 NOTE — PATIENT INSTRUCTIONS
DIAGNOSIS: 2 week follow up right total knee arthroplasty    SUBJECTIVE:Patient returns today for 2 week follow up of right total knee replacement. Patient reports doing well with no unusual complaints. Appears to be progressing appropriately.    OBJECTIVE:   Exam:. The incision is healing appropriately. No sign of infection. Range of motion is progressing as expected -7/90. The calf is soft and nontender with a negative Homans sign.    DIAGNOSTIC STUDIES  Xrays: 2 views of the right knee (AP full length and lateral) were ordered and images were reviewed for evaluation of recent knee replacement. They demonstrate a well positioned, well aligned knee replacement without complicating factors noted. In comparison with previous films there has been interval implant placement.    ASSESSMENT: 2 week status post right total knee replacement expected healing.    PLAN: 1) Staples removed and steri strips applied   2) Order given for PT and pain medicine (as needed)   3) Continue HALLEY hose for four weeks   4) Continue ice PRN   5) Total Knee:  Discontinue Xarelto and begin EC Aspirin 325 mg po once a day until 6 weeks post op.   6) Follow up in 4 weeks with repeat Xrays of right knee (2 views)   7) Continue with antibiotic prophylaxis with dental procedures for 2 yrs post total joint replacement.    Helena Hobson, APRN  2/1/2018     Pain Medications utilized after surgery are narcotics and the law requires that the following information be given to all patients that are prescribed narcotics:    CLASSIFICATION: Pain medications are called Opioids and are narcotics  LEGALITIES: It is illegal to share narcotics with others and to drive within 4 hours of taking narcotics  POTENTIAL SIDE EFFECTS: Potential side effects of opioids include: nausea, vomiting, itching, dizziness, drowsiness, dry mouth, constipation, and difficulty urinating.  POTENTIAL ADVERSE EFFECTS:   Opioid tolerance can develop with use of pain medications and  this simply means that it requires more and more of the medication to control pain; however, this is seen more in patients that use opioids for longer periods of time.  Opioid dependence can develop with use of Opioids and this simply means that to stop the medication can cause withdrawal symptoms so wean gradually (do not stop all at once); however, this is seen more with patients that use opioids for longer periods of time.  Opioid addiction can develop with use of Opioids and the incidence of this is very unlikely in patients who take the medications as ordered and stop the medications as instructed.  Opioid overdose can be dangerous, but is unlikely when the medication is taken as ordered and stopped when ordered. It is important not to mix opioids with alcohol or with and type of sedative such as Benadryl as this can lead to over sedation and respiratory difficulty.    DOSAGE:   Pain medications will need to be taken consistently for the first week to decrease pain and promote adequate pain relief and participation in physical therapy.    After the initial surgical pain begins to resolve, you may begin to decrease the pain medication. By the end of 8 weeks, you should be off of pain medications.    Refills will not be given by the office during evening hours, on weekends.  To seek refills on pain medications during the initial 6 week post-operative period, you must call the office 48 hours in advance to request the refill. The office will then notify you when to  the prescription. DO NOT wait until you are out of the medication to request a refill.

## 2018-03-02 ENCOUNTER — OFFICE VISIT (OUTPATIENT)
Dept: ORTHOPEDIC SURGERY | Facility: CLINIC | Age: 53
End: 2018-03-02

## 2018-03-02 VITALS — HEIGHT: 66 IN | BODY MASS INDEX: 25.55 KG/M2 | WEIGHT: 159 LBS | TEMPERATURE: 97.9 F

## 2018-03-02 DIAGNOSIS — Z96.651 STATUS POST TOTAL RIGHT KNEE REPLACEMENT: ICD-10-CM

## 2018-03-02 DIAGNOSIS — Z96.651 S/P TOTAL KNEE ARTHROPLASTY, RIGHT: Primary | ICD-10-CM

## 2018-03-02 PROCEDURE — 99024 POSTOP FOLLOW-UP VISIT: CPT | Performed by: NURSE PRACTITIONER

## 2018-03-02 PROCEDURE — 73560 X-RAY EXAM OF KNEE 1 OR 2: CPT | Performed by: NURSE PRACTITIONER

## 2018-03-02 RX ORDER — OXYCODONE AND ACETAMINOPHEN 7.5; 325 MG/1; MG/1
TABLET ORAL
Qty: 45 TABLET | Refills: 0 | Status: SHIPPED | OUTPATIENT
Start: 2018-03-02

## 2018-03-02 RX ORDER — FLUOXETINE HYDROCHLORIDE 60 MG/1
TABLET, FILM COATED ORAL; ORAL
Refills: 0 | COMMUNITY
Start: 2018-02-23

## 2018-03-02 RX ORDER — ROPINIROLE 0.25 MG/1
TABLET, FILM COATED ORAL
Refills: 0 | COMMUNITY
Start: 2018-02-22

## 2018-03-02 NOTE — PROGRESS NOTES
Makayla Boo : 1965 MRN: [id number] DATE: 3/2/2018  Body mass index is 25.66 kg/(m^2).  Vitals:    18 1003   Temp: 97.9 °F (36.6 °C)       Chief Complaint and HPI: 6 weeks follow up right total knee    SUBJECTIVE:Patient returns today for follow up of total joint replacement. Patient reports doing well with no unusual complaints. Appears to be progressing appropriately.    OBJECTIVE:   Exam:. The incision is healing appropriately. No sign of infection. Range of motion is progressing as expected -3/125. The calf is soft and nontender with a negative Homans sign.    DIAGNOSTIC STUDIES  Xrays: 2 views of the rightknee (AP full length and lateral) were ordered and images were reviewed for evaluation of recent joint replacement. They demonstrate a well positioned, well aligned total joint replacement without complicating factors noted. In comparison with previous films there has been no alignment change.    ASSESSMENT: status post right total knee replacement expected healing.    PLAN: 1) Continue with PT exercises as prescribed   2) Follow up 3 MONTHS  WITH XRAYS (2 views of same joint).   3) Continue with antibiotic prophylaxis with dental procedures for 2 yrs post total joint replacement.   4) May discontinue Aspirin therapy from surgery at this time and resume medications, vitamins, and supplements you were taking before surgery.     Helena Hobson, APRN  3/2/2018     Pain Medications utilized after surgery are narcotics and the law requires that the following information be given to all patients that are prescribed narcotics:    CLASSIFICATION: Pain medications are called Opioids and are narcotics  LEGALITIES: It is illegal to share narcotics with others and to drive within 4 hours of taking narcotics  POTENTIAL SIDE EFFECTS: Potential side effects of opioids include: nausea, vomiting, itching, dizziness, drowsiness, dry mouth, constipation, and difficulty urinating.  POTENTIAL ADVERSE EFFECTS:   Opioid  tolerance can develop with use of pain medications and this simply means that it requires more and more of the medication to control pain; however, this is seen more in patients that use opioids for longer periods of time.  Opioid dependence can develop with use of Opioids and this simply means that to stop the medication can cause withdrawal symptoms so wean gradually (do not stop all at once); however, this is seen more with patients that use opioids for longer periods of time.  Opioid addiction can develop with use of Opioids and the incidence of this is very unlikely in patients who take the medications as ordered and stop the medications as instructed.  Opioid overdose can be dangerous, but is unlikely when the medication is taken as ordered and stopped when ordered. It is important not to mix opioids with alcohol or with and type of sedative such as Benadryl as this can lead to over sedation and respiratory difficulty.    DOSAGE:   Pain medications will need to be taken consistently for the first week to decrease pain and promote adequate pain relief and participation in physical therapy.    After the initial surgical pain begins to resolve, you may begin to decrease the pain medication. By the end of 8 weeks, you should be off of pain medications.    Refills will not be given by the office during evening hours, on weekends.  To seek refills on pain medications during the initial 6 week post-operative period, you must call the office 48 hours in advance to request the refill. The office will then notify you when to  the prescription. DO NOT wait until you are out of the medication to request a refill.

## 2018-03-02 NOTE — PATIENT INSTRUCTIONS
Makayla Boo : 1965 MRN: [id number] DATE: 3/2/2018  Body mass index is 25.66 kg/(m^2).  Vitals:    18 1003   Temp: 97.9 °F (36.6 °C)       Chief Complaint and HPI: 6 weeks follow up right total knee    SUBJECTIVE:Patient returns today for follow up of total joint replacement. Patient reports doing well with no unusual complaints. Appears to be progressing appropriately.    OBJECTIVE:   Exam:. The incision is healing appropriately. No sign of infection. Range of motion is progressing as expected -3/125. The calf is soft and nontender with a negative Homans sign.    DIAGNOSTIC STUDIES  Xrays: 2 views of the rightknee (AP full length and lateral) were ordered and images were reviewed for evaluation of recent joint replacement. They demonstrate a well positioned, well aligned total joint replacement without complicating factors noted. In comparison with previous films there has been no alignment change.    ASSESSMENT: status post right total knee replacement expected healing.    PLAN: 1) Continue with PT exercises as prescribed   2) Follow up 3 MONTHS  WITH XRAYS (2 views of same joint).   3) Continue with antibiotic prophylaxis with dental procedures for 2 yrs post total joint replacement.   4) May discontinue Aspirin therapy from surgery at this time and resume medications, vitamins, and supplements you were taking before surgery.     Helena Hobson, APRN  3/2/2018     Pain Medications utilized after surgery are narcotics and the law requires that the following information be given to all patients that are prescribed narcotics:    CLASSIFICATION: Pain medications are called Opioids and are narcotics  LEGALITIES: It is illegal to share narcotics with others and to drive within 4 hours of taking narcotics  POTENTIAL SIDE EFFECTS: Potential side effects of opioids include: nausea, vomiting, itching, dizziness, drowsiness, dry mouth, constipation, and difficulty urinating.  POTENTIAL ADVERSE EFFECTS:   Opioid  tolerance can develop with use of pain medications and this simply means that it requires more and more of the medication to control pain; however, this is seen more in patients that use opioids for longer periods of time.  Opioid dependence can develop with use of Opioids and this simply means that to stop the medication can cause withdrawal symptoms so wean gradually (do not stop all at once); however, this is seen more with patients that use opioids for longer periods of time.  Opioid addiction can develop with use of Opioids and the incidence of this is very unlikely in patients who take the medications as ordered and stop the medications as instructed.  Opioid overdose can be dangerous, but is unlikely when the medication is taken as ordered and stopped when ordered. It is important not to mix opioids with alcohol or with and type of sedative such as Benadryl as this can lead to over sedation and respiratory difficulty.    DOSAGE:   Pain medications will need to be taken consistently for the first week to decrease pain and promote adequate pain relief and participation in physical therapy.    After the initial surgical pain begins to resolve, you may begin to decrease the pain medication. By the end of 8 weeks, you should be off of pain medications.    Refills will not be given by the office during evening hours, on weekends.  To seek refills on pain medications during the initial 6 week post-operative period, you must call the office 48 hours in advance to request the refill. The office will then notify you when to  the prescription. DO NOT wait until you are out of the medication to request a refill.

## 2019-04-11 ENCOUNTER — OFFICE VISIT (OUTPATIENT)
Dept: ORTHOPEDIC SURGERY | Facility: CLINIC | Age: 54
End: 2019-04-11

## 2019-04-11 VITALS — TEMPERATURE: 97.7 F | HEIGHT: 65 IN | WEIGHT: 150 LBS | BODY MASS INDEX: 24.99 KG/M2

## 2019-04-11 DIAGNOSIS — Z96.651 STATUS POST TOTAL RIGHT KNEE REPLACEMENT: ICD-10-CM

## 2019-04-11 DIAGNOSIS — Z96.651 TOTAL KNEE REPLACEMENT STATUS, RIGHT: Primary | ICD-10-CM

## 2019-04-11 PROCEDURE — 73560 X-RAY EXAM OF KNEE 1 OR 2: CPT | Performed by: NURSE PRACTITIONER

## 2019-04-11 PROCEDURE — 99213 OFFICE O/P EST LOW 20 MIN: CPT | Performed by: NURSE PRACTITIONER

## 2019-04-11 RX ORDER — IBUPROFEN 800 MG/1
800 TABLET ORAL EVERY 6 HOURS PRN
Qty: 90 TABLET | Refills: 2 | Status: SHIPPED | OUTPATIENT
Start: 2019-04-11

## 2019-04-11 NOTE — PATIENT INSTRUCTIONS
ASSESSMENT: Status post right total knee replacement with expected healing    PLAN:   Is likely just a flare of inflammation that I recommend ice and ibuprofen for 2 weeks to see  improvement and to start doing daily thigh strengthening exercises as she says she is too busy for more PT right now.   1) Continue home PT exercises as needed. I will send in rx for ibuprofen 800mg to take bid for the next two weeks.    2) Continue with antibiotic prophylaxis with dental procedures for 2 yrs post total joint replacement.   3) Follow up as needed.    Do Daily THIGH exercises sitting up straight in a supportive chair, lean forward at the hips keeping the back as straight as possible, and in that forward leaning position, lift the thigh very slowly up and down.  Give assist with your hand under the knee to lift as needed. 15x on each thigh once a day, every day.           4/11/2019  Patient was seen by ESTELA Vu in the office today.

## 2019-04-11 NOTE — PROGRESS NOTES
NAME: Makayla Boo  : 1965   MRN: [id number]   DATE: 2019    CC: 15 months Follow up status post total joint replacement right knee    SUBJECTIVE:    HPI: Patient returns today for a follow up of total joint replacement. Patient reports that is aching and feels more swollen the last few weeks and wants to have it checked today. Denies chills, fever, difficulty with wt bearing or bending.  Appears to be progressing appropriately.  HPI    This problem is new to this examiner.     Allergies:   Allergies   Allergen Reactions   • Codeine Nausea And Vomiting   • Adhesive Tape Rash   • Morphine Nausea And Vomiting       Medications:   Home Medications:  Current Outpatient Medications on File Prior to Visit   Medication Sig   • cyclobenzaprine (FLEXERIL) 10 MG tablet Take 10 mg by mouth 3 (Three) Times a Day As Needed for Muscle Spasms.   • FLUoxetine (PROzac) 60 MG tablet    • gabapentin (NEURONTIN) 400 MG capsule Take 400 mg by mouth 3 (Three) Times a Day As Needed.   • losartan (COZAAR) 100 MG tablet Take 100 mg by mouth Every Morning.   • rOPINIRole (REQUIP) 0.25 MG tablet take 1 tablet by mouth at bedtime   • oxyCODONE-acetaminophen (PERCOCET) 7.5-325 MG per tablet 1-2 tabs po q 3-4 hr prn severe pain, wean as tolerated     No current facility-administered medications on file prior to visit.        Current Medications:  Scheduled Meds:  Continuous Infusions:  No current facility-administered medications for this visit.   PRN Meds:.    I have reviewed the patient's medical history in detail and updated the computerized patient record.  Review and summarization of old records include:    Past Medical History:   Diagnosis Date   • Anxiety    • Arthritis     RA   • COPD (chronic obstructive pulmonary disease) (CMS/HCC)    • Depression    • Heart murmur    • Hepatitis C carrier (CMS/HCC)    • History of MRSA infection 2016    right wrist   • History of sepsis     RT HAND/WIRST 2016   • History of transfusion    •  Hypertension    • Knee pain         Past Surgical History:   Procedure Laterality Date   • BUNIONECTOMY Bilateral    • CARPAL TUNNEL RELEASE Bilateral    • CERVICAL FUSION     • INCISION AND DRAINAGE ABSCESS      RT HAND/WRIST X1   • KNEE ARTHROSCOPY     • KNEE ARTHROSCOPY W/ ACL RECONSTRUCTION Right    • ORIF FINGER / THUMB FRACTURE Right    • TOTAL KNEE ARTHROPLASTY Right 1/17/2018    Procedure: RIGHT TOTAL KNEE ARTHROPLASTY WITH HERBERT NAVIGATION;  Surgeon: Arthur Higgins MD;  Location: [facility] MAIN OR;  Service:         Social History     Occupational History   • Not on file   Tobacco Use   • Smoking status: Current Every Day Smoker     Packs/day: 0.50     Years: 30.00     Pack years: 15.00     Types: Cigarettes   • Smokeless tobacco: Never Used   • Tobacco comment: last cigarette 1/17/18 0530   Substance and Sexual Activity   • Alcohol use: Yes     Comment: social   • Drug use: Yes     Types: Marijuana     Comment: last use 1/17/18 - daily use.    • Sexual activity: Defer    Social History     Social History Narrative   • Not on file        Family History   Problem Relation Age of Onset   • Malig Hyperthermia Neg Hx        ROS: 14 point review of systems was performed and was negative except for documented findings in HPI and today's encounter.     Allergies:   Allergies   Allergen Reactions   • Codeine Nausea And Vomiting   • Adhesive Tape Rash   • Morphine Nausea And Vomiting     Constitutional:  Denies fever, shaking or chills   Eyes:  Denies change in visual acuity   HENT:  Denies nasal congestion or sore throat   Respiratory:  Denies cough or shortness of breath   Cardiovascular:  Denies chest pain or severe LE edema   GI:  Denies abdominal pain, nausea, vomiting, bloody stools or diarrhea   Musculoskeletal:  Denies numbness tingling or loss of motor function except as outlined above in history of present illness.  : Denies painful urination or hematuria  Integument:  Denies rash, lesion or ulceration  "  Neurologic:  Denies headache or focal weakness  Endocrine:  Denies lymphadenopathy  Psych:  Denies confusion or change in mental status   Hem:  Denies active bleeding        OBJECTIVE:     Physical Exam:  Vital Signs:    Wt Readings from Last 3 Encounters:   04/11/19 68 kg (150 lb)   03/02/18 72.1 kg (159 lb)   02/01/18 72.6 kg (160 lb)     Ht Readings from Last 3 Encounters:   04/11/19 165.1 cm (65\")   03/02/18 167.6 cm (66\")   02/01/18 167.6 cm (66\")     Body mass index is 24.96 kg/m².  Facility age limit for growth percentiles is 20 years.  Vitals:    04/11/19 1035   Temp: 97.7 °F (36.5 °C)       Constitutional: Awake alert and oriented x3, well developed, well nourished, no acute distress, non-toxic appearance.  HEENT:  Normocephalic, Atraumatic, Bilateral external ears normal, Oropharynx moist, No oral exudates, Nose normal.   Respiratory:  No respiratory distress, No wheezing  CV: No palpitations  Vascular:  Brisk cap refill, Intact distal pulses, No cyanosis, all compartments soft with no signs or symptoms of compartment syndrome or DVT.  Neurologic: Sensation grossly intact to light touch throughout the involved extremity and bilaterally symmetric, deep tendon reflexes are 2+ and bilaterally symmetric, No focal deficits noted.   Neck:  Normal range of motion, No tenderness, Supple, Negative Spurlings.  Integument: Well hydrated, no rash, warm, dry, no lesions or ulceration.   Musculoskeletal:  Affected extremity post op incision is healed appropriately. No sign of infection. Range of motion is progressing as expected. The calf is soft and nontender with a negative Homans sign. Distal pulses intact. Normal amount of swelling present for recovery time.     DIAGNOSTIC STUDIES  Imaging done today, images were personally viewed and discussed with the patient:  Indication, findings and comparison: 2V AP&Lat of the operative joint viewed for evaluation of past joint replacement and discussed with patient. They " demonstrate a well positioned, well aligned total joint replacement without complicating factors noted. In comparison with the film from the last office visit, there has been no alignment change.    ASSESSMENT: Status post right total knee replacement with expected healing    PLAN:   Is likely just a flare of inflammation that I recommend ice and ibuprofen for 2 weeks to see  improvement and to start doing daily thigh strengthening exercises as she says she is too busy for more PT right now.    1) Continue home PT exercises as needed. I will send in rx for ibuprofen 800mg to take bid for the next two weeks.    2) Continue with antibiotic prophylaxis with dental procedures for 2 yrs post total joint replacement.   3) Follow up as needed.    4/11/2019  Patient was seen by ESTELA Vu in the office today.

## (undated) DEVICE — DUAL CUT SAGITTAL BLADE

## (undated) DEVICE — GLV SURG TRIUMPH CLASSIC PF LTX 8 STRL

## (undated) DEVICE — GLV SURG TRIUMPH CLASSIC PF LTX 9 STRL

## (undated) DEVICE — ENCORE® LATEX ORTHO SIZE 8.5, STERILE LATEX POWDER-FREE SURGICAL GLOVE: Brand: ENCORE

## (undated) DEVICE — GLV SURG SENSICARE MICRO PF LF 8 STRL

## (undated) DEVICE — STPLR SKIN VISISTAT WD 35CT

## (undated) DEVICE — ENCORE® LATEX ORTHO SIZE 8, STERILE LATEX POWDER-FREE SURGICAL GLOVE: Brand: ENCORE

## (undated) DEVICE — UNDERCAST PADDING: Brand: DEROYAL

## (undated) DEVICE — OCCLUSIVE GAUZE STRIP,3% BISMUTH TRIBROMOPHENATE IN PETROLATUM BLEND: Brand: XEROFORM

## (undated) DEVICE — NDL SPINE 18G 31/2IN PNK

## (undated) DEVICE — GAUZE,SPONGE,FLUFF,6"X6.75",STRL,10/TRAY: Brand: MEDLINE

## (undated) DEVICE — APPL CHLORAPREP W/TINT 26ML ORNG

## (undated) DEVICE — P.F.C. DRILL BIT AND STEINMAN PIN PACKET (1 UNIT) .125IN DIA 5IN LGTH: Brand: P.F.C.

## (undated) DEVICE — PREMIUM WET SKIN PREP TRAY: Brand: MEDLINE INDUSTRIES, INC.

## (undated) DEVICE — SYR LUERLOK 30CC

## (undated) DEVICE — PK KN TOTL 40

## (undated) DEVICE — SUT VICRYL 1 CT1 27IN  JJ40G

## (undated) DEVICE — INSTRUMENT BATTERY

## (undated) DEVICE — BNDG ELAS ELITE V/CLOSE 6IN 5YD LF STRL

## (undated) DEVICE — PAD,ABDOMINAL,8"X10",ST,LF: Brand: MEDLINE

## (undated) DEVICE — COVER,MAYO STAND,STERILE: Brand: MEDLINE

## (undated) DEVICE — MAT FLR ABSORBENT LG 4FT 10 2.5FT

## (undated) DEVICE — GLV SURG SENSICARE GREEN W/ALOE PF LF 9 STRL